# Patient Record
Sex: FEMALE | Race: WHITE | NOT HISPANIC OR LATINO | Employment: FULL TIME | URBAN - METROPOLITAN AREA
[De-identification: names, ages, dates, MRNs, and addresses within clinical notes are randomized per-mention and may not be internally consistent; named-entity substitution may affect disease eponyms.]

---

## 2017-08-08 ENCOUNTER — HOSPITAL ENCOUNTER (OUTPATIENT)
Facility: MEDICAL CENTER | Age: 32
End: 2017-08-08
Attending: ORTHOPAEDIC SURGERY | Admitting: ORTHOPAEDIC SURGERY
Payer: COMMERCIAL

## 2017-08-08 ENCOUNTER — APPOINTMENT (OUTPATIENT)
Dept: RADIOLOGY | Facility: MEDICAL CENTER | Age: 32
End: 2017-08-08
Attending: ORTHOPAEDIC SURGERY
Payer: COMMERCIAL

## 2017-08-08 VITALS
DIASTOLIC BLOOD PRESSURE: 54 MMHG | HEIGHT: 67 IN | RESPIRATION RATE: 14 BRPM | BODY MASS INDEX: 26.54 KG/M2 | WEIGHT: 169.09 LBS | TEMPERATURE: 98.8 F | HEART RATE: 61 BPM | OXYGEN SATURATION: 95 % | SYSTOLIC BLOOD PRESSURE: 106 MMHG

## 2017-08-08 PROBLEM — S92.254A CLOSED NONDISPLACED FRACTURE OF NAVICULAR BONE OF RIGHT FOOT: Status: ACTIVE | Noted: 2017-08-08

## 2017-08-08 LAB
HCG UR QL: NEGATIVE
SP GR UR REFRACTOMETRY: 1.02

## 2017-08-08 PROCEDURE — 700111 HCHG RX REV CODE 636 W/ 250 OVERRIDE (IP)

## 2017-08-08 PROCEDURE — 160047 HCHG PACU  - EA ADDL 30 MINS PHASE II: Performed by: ORTHOPAEDIC SURGERY

## 2017-08-08 PROCEDURE — 500423 HCHG DRESSING, ABD COMBINE: Performed by: ORTHOPAEDIC SURGERY

## 2017-08-08 PROCEDURE — 502000 HCHG MISC OR IMPLANTS RC 0278: Performed by: ORTHOPAEDIC SURGERY

## 2017-08-08 PROCEDURE — 160048 HCHG OR STATISTICAL LEVEL 1-5: Performed by: ORTHOPAEDIC SURGERY

## 2017-08-08 PROCEDURE — 500881 HCHG PACK, EXTREMITY: Performed by: ORTHOPAEDIC SURGERY

## 2017-08-08 PROCEDURE — 160046 HCHG PACU - 1ST 60 MINS PHASE II: Performed by: ORTHOPAEDIC SURGERY

## 2017-08-08 PROCEDURE — 160022 HCHG BLOCK: Performed by: ORTHOPAEDIC SURGERY

## 2017-08-08 PROCEDURE — 160035 HCHG PACU - 1ST 60 MINS PHASE I: Performed by: ORTHOPAEDIC SURGERY

## 2017-08-08 PROCEDURE — 160039 HCHG SURGERY MINUTES - EA ADDL 1 MIN LEVEL 3: Performed by: ORTHOPAEDIC SURGERY

## 2017-08-08 PROCEDURE — 160002 HCHG RECOVERY MINUTES (STAT): Performed by: ORTHOPAEDIC SURGERY

## 2017-08-08 PROCEDURE — 73630 X-RAY EXAM OF FOOT: CPT | Mod: RT

## 2017-08-08 PROCEDURE — 501480 HCHG STOCKINETTE: Performed by: ORTHOPAEDIC SURGERY

## 2017-08-08 PROCEDURE — A9270 NON-COVERED ITEM OR SERVICE: HCPCS

## 2017-08-08 PROCEDURE — 500367 HCHG DRAIN KIT, HEMOVAC: Performed by: ORTHOPAEDIC SURGERY

## 2017-08-08 PROCEDURE — 700101 HCHG RX REV CODE 250

## 2017-08-08 PROCEDURE — 700102 HCHG RX REV CODE 250 W/ 637 OVERRIDE(OP)

## 2017-08-08 PROCEDURE — 160036 HCHG PACU - EA ADDL 30 MINS PHASE I: Performed by: ORTHOPAEDIC SURGERY

## 2017-08-08 PROCEDURE — 81025 URINE PREGNANCY TEST: CPT

## 2017-08-08 PROCEDURE — 160009 HCHG ANES TIME/MIN: Performed by: ORTHOPAEDIC SURGERY

## 2017-08-08 PROCEDURE — 160025 RECOVERY II MINUTES (STATS): Performed by: ORTHOPAEDIC SURGERY

## 2017-08-08 PROCEDURE — 501838 HCHG SUTURE GENERAL: Performed by: ORTHOPAEDIC SURGERY

## 2017-08-08 PROCEDURE — 160028 HCHG SURGERY MINUTES - 1ST 30 MINS LEVEL 3: Performed by: ORTHOPAEDIC SURGERY

## 2017-08-08 PROCEDURE — A6454 SELF-ADHER BAND W>=3" <5"/YD: HCPCS | Performed by: ORTHOPAEDIC SURGERY

## 2017-08-08 PROCEDURE — 502240 HCHG MISC OR SUPPLY RC 0272: Performed by: ORTHOPAEDIC SURGERY

## 2017-08-08 DEVICE — IMPLANTABLE DEVICE: Type: IMPLANTABLE DEVICE | Status: FUNCTIONAL

## 2017-08-08 RX ORDER — HALOPERIDOL 5 MG/ML
INJECTION INTRAMUSCULAR
Status: COMPLETED
Start: 2017-08-08 | End: 2017-08-08

## 2017-08-08 RX ORDER — SODIUM CHLORIDE, SODIUM LACTATE, POTASSIUM CHLORIDE, CALCIUM CHLORIDE 600; 310; 30; 20 MG/100ML; MG/100ML; MG/100ML; MG/100ML
INJECTION, SOLUTION INTRAVENOUS CONTINUOUS
Status: DISCONTINUED | OUTPATIENT
Start: 2017-08-08 | End: 2017-08-08 | Stop reason: HOSPADM

## 2017-08-08 RX ORDER — LORAZEPAM 2 MG/ML
INJECTION INTRAMUSCULAR
Status: COMPLETED
Start: 2017-08-08 | End: 2017-08-08

## 2017-08-08 RX ORDER — OXYCODONE HCL 5 MG/5 ML
SOLUTION, ORAL ORAL
Status: COMPLETED
Start: 2017-08-08 | End: 2017-08-08

## 2017-08-08 RX ADMIN — FENTANYL CITRATE 50 MCG: 50 INJECTION, SOLUTION INTRAMUSCULAR; INTRAVENOUS at 12:32

## 2017-08-08 RX ADMIN — HYDROMORPHONE HYDROCHLORIDE 0.25 MG: 1 INJECTION, SOLUTION INTRAMUSCULAR; INTRAVENOUS; SUBCUTANEOUS at 13:02

## 2017-08-08 RX ADMIN — HALOPERIDOL LACTATE 1 MG: 5 INJECTION, SOLUTION INTRAMUSCULAR at 11:37

## 2017-08-08 RX ADMIN — LORAZEPAM 1 MG: 2 INJECTION INTRAMUSCULAR; INTRAVENOUS at 11:30

## 2017-08-08 RX ADMIN — FENTANYL CITRATE 50 MCG: 50 INJECTION, SOLUTION INTRAMUSCULAR; INTRAVENOUS at 12:05

## 2017-08-08 RX ADMIN — OXYCODONE HYDROCHLORIDE 10 MG: 5 SOLUTION ORAL at 12:10

## 2017-08-08 RX ADMIN — HYDROMORPHONE HYDROCHLORIDE 0.5 MG: 1 INJECTION, SOLUTION INTRAMUSCULAR; INTRAVENOUS; SUBCUTANEOUS at 13:08

## 2017-08-08 ASSESSMENT — PAIN SCALES - GENERAL
PAINLEVEL_OUTOF10: 6
PAINLEVEL_OUTOF10: 8
PAINLEVEL_OUTOF10: 6
PAINLEVEL_OUTOF10: 7
PAINLEVEL_OUTOF10: 6
PAINLEVEL_OUTOF10: 8
PAINLEVEL_OUTOF10: 6
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
## 2017-08-08 NOTE — OR SURGEON
Operative Report    PreOp Diagnosis: Right navicular fx    PostOp Diagnosis: Same, stable ankle    Procedure(s):  FOOT ORIF NAVICULAR  - Wound Class: Clean    Surgeon(s):  Patrick Whitten M.D.    Anesthesiologist/Type of Anesthesia:  Anesthesiologist: Tong Forde M.D./General    Surgical Staff:  Circulator: Lali Tsang R.N.  Relief Circulator: Dorys Hernández R.N.  Relief Scrub: Hector Corbin R.N.  Scrub Person: Libby Kramer    Specimens:  * No specimens in log *    Estimated Blood Loss: 5cc    TT: 12 min @ 250mmHg    Findings: Stable ankle    Complications: None        8/8/2017 11:53 AM Patrick Whitten

## 2017-08-08 NOTE — DISCHARGE INSTRUCTIONS
ACTIVITY: Rest and take it easy for the first 24 hours.  A responsible adult is recommended to remain with you during that time.  It is normal to feel sleepy.  We encourage you to not do anything that requires balance, judgment or coordination.    MILD FLU-LIKE SYMPTOMS ARE NORMAL. YOU MAY EXPERIENCE GENERALIZED MUSCLE ACHES, THROAT IRRITATION, HEADACHE AND/OR SOME NAUSEA.    FOR 24 HOURS DO NOT:  Drive, operate machinery or run household appliances.  Drink beer or alcoholic beverages.   Make important decisions or sign legal documents.    SPECIAL INSTRUCTIONS:NWB RLE  Ice and elevate  Keep splint clean and dry  Stay ahead  of pain    DIET: To avoid nausea, slowly advance diet as tolerated, avoiding spicy or greasy foods for the first day.  Add more substantial food to your diet according to your physician's instructions.  Babies can be fed formula or breast milk as soon as they are hungry.  INCREASE FLUIDS AND FIBER TO AVOID CONSTIPATION.    SURGICAL DRESSING/BATHING:cover dressing while bathing    FOLLOW-UP APPOINTMENT:as scheduled      You should CALL YOUR PHYSICIAN if you develop:  Fever greater than 101 degrees F.  Pain not relieved by medication, or persistent nausea or vomiting.  Excessive bleeding (blood soaking through dressing) or unexpected drainage from the wound.  Extreme redness or swelling around the incision site, drainage of pus or foul smelling drainage.  Inability to urinate or empty your bladder within 8 hours.  Problems with breathing or chest pain.    You should call 911 if you develop problems with breathing or chest pain.  If you are unable to contact your doctor or surgical center, you should go to the nearest emergency room or urgent care center.       If any questions arise, call your doctor.  If your doctor is not available, please feel free to call the Surgical Center at [phone].  The Center is open Monday through Friday from 7AM to 7PM.  You can also call the HEALTH HOTLINE open  24 hours/day, 7 days/week and speak to a nurse at [phone], or toll free at [phone].    A registered nurse may call you a few days after your surgery to see how you are doing after your procedure.    MEDICATIONS: Resume taking daily medication.  Take prescribed pain medication with food.  If no medication is prescribed, you may take non-aspirin pain medication if needed.  PAIN MEDICATION CAN BE VERY CONSTIPATING.  Take a stool softener or laxative such as senokot, pericolace, or milk of magnesia if needed.        If your physician has prescribed pain medication that includes Acetaminophen (Tylenol), do not take additional Acetaminophen (Tylenol) while taking the prescribed medication.    Depression / Suicide Risk    As you are discharged from this [facility] facility, it is important to learn how to keep safe from harming yourself.    Recognize the warning signs:  · Abrupt changes in personality, positive or negative- including increase in energy   · Giving away possessions  · Change in eating patterns- significant weight changes-  positive or negative  · Change in sleeping patterns- unable to sleep or sleeping all the time   · Unwillingness or inability to communicate  · Depression  · Unusual sadness, discouragement and loneliness  · Talk of wanting to die  · Neglect of personal appearance   · Rebelliousness- reckless behavior  · Withdrawal from people/activities they love  · Confusion- inability to concentrate     If you or a loved one observes any of these behaviors or has concerns about self-harm, here's what you can do:  · Talk about it- your feelings and reasons for harming yourself  · Remove any means that you might use to hurt yourself (examples: pills, rope, extension cords, firearm)  · Get professional help from the community (Mental Health, Substance Abuse, psychological counseling)  · Do not be alone:Call your Safe Contact- someone whom you trust who will be there for you.  · Call your  local CRISIS HOTLINE 105-8302 or 257-958-8272  · Call your local Children's Mobile Crisis Response Team [place] (288) 918-5799 or www.[employer]  · Call the toll free National Suicide Prevention Hotlines   · National Suicide Prevention Lifeline 420-243-XHAD (8113)  · National [facility] [place] Line Network 800-SUICIDE (954-5833)

## 2017-08-08 NOTE — IP AVS SNAPSHOT
" Home Care Instructions                                                                                                                Name:Thania Hilliard  Medical Record Number:[id number]  CSN: [id number]    YOB: 1985   Age: 32 y.o.  Sex: female  HT:1.702 m (5' 7\") WT: 76.7 kg (169 lb 1.5 oz)          Admit Date: 8/8/2017     Discharge Date:   Today's Date: 8/8/2017  Attending Doctor:  Patrick Whitten M.D.                  Allergies:  Other food; Promethazine hcl; and Tape                Discharge Instructions         ACTIVITY: Rest and take it easy for the first 24 hours.  A responsible adult is recommended to remain with you during that time.  It is normal to feel sleepy.  We encourage you to not do anything that requires balance, judgment or coordination.    MILD FLU-LIKE SYMPTOMS ARE NORMAL. YOU MAY EXPERIENCE GENERALIZED MUSCLE ACHES, THROAT IRRITATION, HEADACHE AND/OR SOME NAUSEA.    FOR 24 HOURS DO NOT:  Drive, operate machinery or run household appliances.  Drink beer or alcoholic beverages.   Make important decisions or sign legal documents.    SPECIAL INSTRUCTIONS:NWB RLE  Ice and elevate  Keep splint clean and dry  Stay ahead  of pain    DIET: To avoid nausea, slowly advance diet as tolerated, avoiding spicy or greasy foods for the first day.  Add more substantial food to your diet according to your physician's instructions.  Babies can be fed formula or breast milk as soon as they are hungry.  INCREASE FLUIDS AND FIBER TO AVOID CONSTIPATION.    SURGICAL DRESSING/BATHING:cover dressing while bathing    FOLLOW-UP APPOINTMENT:as scheduled      You should CALL YOUR PHYSICIAN if you develop:  Fever greater than 101 degrees F.  Pain not relieved by medication, or persistent nausea or vomiting.  Excessive bleeding (blood soaking through dressing) or unexpected drainage from the wound.  Extreme redness or swelling around the incision site, drainage of pus or foul smelling drainage.  Inability to " urinate or empty your bladder within 8 hours.  Problems with breathing or chest pain.    You should call 911 if you develop problems with breathing or chest pain.  If you are unable to contact your doctor or surgical center, you should go to the nearest emergency room or urgent care center.       If any questions arise, call your doctor.  If your doctor is not available, please feel free to call the Surgical Center at [phone].  The Center is open Monday through Friday from 7AM to 7PM.  You can also call the HEALTH HOTLINE open 24 hours/day, 7 days/week and speak to a nurse at [phone], or toll free at [phone].    A registered nurse may call you a few days after your surgery to see how you are doing after your procedure.    MEDICATIONS: Resume taking daily medication.  Take prescribed pain medication with food.  If no medication is prescribed, you may take non-aspirin pain medication if needed.  PAIN MEDICATION CAN BE VERY CONSTIPATING.  Take a stool softener or laxative such as senokot, pericolace, or milk of magnesia if needed.        If your physician has prescribed pain medication that includes Acetaminophen (Tylenol), do not take additional Acetaminophen (Tylenol) while taking the prescribed medication.    Depression / Suicide Risk    As you are discharged from this [facility] facility, it is important to learn how to keep safe from harming yourself.    Recognize the warning signs:  · Abrupt changes in personality, positive or negative- including increase in energy   · Giving away possessions  · Change in eating patterns- significant weight changes-  positive or negative  · Change in sleeping patterns- unable to sleep or sleeping all the time   · Unwillingness or inability to communicate  · Depression  · Unusual sadness, discouragement and loneliness  · Talk of wanting to die  · Neglect of personal appearance   · Rebelliousness- reckless behavior  · Withdrawal from people/activities they  love  · Confusion- inability to concentrate     If you or a loved one observes any of these behaviors or has concerns about self-harm, here's what you can do:  · Talk about it- your feelings and reasons for harming yourself  · Remove any means that you might use to hurt yourself (examples: pills, rope, extension cords, firearm)  · Get professional help from the community (Mental Health, Substance Abuse, psychological counseling)  · Do not be alone:Call your Safe Contact- someone whom you trust who will be there for you.  · Call your local CRISIS HOTLINE 432-0324 or 867-963-7159  · Call your local Children's Mobile Crisis Response Team [place] (523) 220-0276 or www.[employer]  · Call the toll free National Suicide Prevention Hotlines   · National Suicide Prevention Lifeline 070-475-ROWV (6307)  · [place] [employer] Line Network 800-SUICIDE (934-7269)       Medication List      CONTINUE taking these medications        Instructions    Morning Afternoon Evening Bedtime    LEVOTHYROXINE SODIUM        Take 225 mcg by mouth every day.   Dose:  225 mcg                                Medication Information     Above and/or attached are the medications your physician expects you to take upon discharge. Review all of your home medications and newly ordered medications with your doctor and/or pharmacist. Follow medication instructions as directed by your doctor and/or pharmacist. Please keep your medication list with you and share with your physician. Update the information when medications are discontinued, doses are changed, or new medications (including over-the-counter products) are added; and carry medication information at all times in the event of emergency situations.        Resources     Quit Smoking / Tobacco Use:    I understand the use of any tobacco products increases my chance of suffering from future heart disease or stroke and could cause other illnesses which may shorten my life. Quitting the use of  tobacco products is the single most important thing I can do to improve my health. For further information on smoking / tobacco cessation call a Toll Free Quit Line at 1-889.361.3996 (*National Cancer [place]) or 1-960.333.5985 (American Lung Association) or you can access the web based program at www.lungusa.org.    [place] Tobacco Users Help Line:  (274) 105-3560       Toll Free: 1-580.957.5682  Quit Tobacco Program [facility] (178)126-0202    Crisis Hotline:    [place] Crisis Hotline:  1-393-OVOUWYF or 1-401.907.4831    [place] Crisis Hotline:    1-275.421.8120 or 829-330-0239    Discharge Survey:   Thank you for choosing [facility]. We hope we did everything we could to make your hospital stay a pleasant one. You may be receiving a survey and we would appreciate your time and participation in answering the questions. Your input is very valuable to us in our efforts to improve our service to our patients and their families.            Signatures     My signature on this form indicates that:    1. I acknowledge receipt and understanding of these Home Care Instruction.  2. My questions regarding this information have been answered to my satisfaction.  3. I have formulated a plan with my discharge nurse to obtain my prescribed medications for home.    __________________________________      __________________________________                   Patient Signature                                 Guardian/Responsible Adult Signature      __________________________________                 __________       ________                       Nurse Signature                                               Date                 Time
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
## 2018-06-19 ENCOUNTER — HOSPITAL ENCOUNTER (EMERGENCY)
Facility: MEDICAL CENTER | Age: 33
End: 2018-06-19
Attending: EMERGENCY MEDICINE
Payer: COMMERCIAL

## 2018-06-19 ENCOUNTER — OCCUPATIONAL MEDICINE (OUTPATIENT)
Dept: URGENT CARE | Facility: CLINIC | Age: 33
End: 2018-06-19
Payer: COMMERCIAL

## 2018-06-19 VITALS
WEIGHT: 175.6 LBS | OXYGEN SATURATION: 99 % | HEIGHT: 67 IN | BODY MASS INDEX: 27.56 KG/M2 | RESPIRATION RATE: 16 BRPM | TEMPERATURE: 99 F | SYSTOLIC BLOOD PRESSURE: 120 MMHG | HEART RATE: 71 BPM | DIASTOLIC BLOOD PRESSURE: 80 MMHG

## 2018-06-19 VITALS
RESPIRATION RATE: 16 BRPM | TEMPERATURE: 97.5 F | HEART RATE: 68 BPM | WEIGHT: 170 LBS | DIASTOLIC BLOOD PRESSURE: 83 MMHG | SYSTOLIC BLOOD PRESSURE: 131 MMHG | BODY MASS INDEX: 26.68 KG/M2 | HEIGHT: 67 IN

## 2018-06-19 DIAGNOSIS — S09.90XD INJURY OF HEAD, SUBSEQUENT ENCOUNTER: ICD-10-CM

## 2018-06-19 DIAGNOSIS — R11.2 INTRACTABLE VOMITING WITH NAUSEA, UNSPECIFIED VOMITING TYPE: ICD-10-CM

## 2018-06-19 DIAGNOSIS — E83.51 HYPOCALCEMIA: ICD-10-CM

## 2018-06-19 DIAGNOSIS — F07.81 POST CONCUSSION SYNDROME: ICD-10-CM

## 2018-06-19 DIAGNOSIS — F07.81 POST CONCUSSIVE SYNDROME: ICD-10-CM

## 2018-06-19 LAB
ALBUMIN SERPL BCP-MCNC: 4 G/DL (ref 3.2–4.9)
ALBUMIN/GLOB SERPL: 1.9 G/DL
ALP SERPL-CCNC: 53 U/L (ref 30–99)
ALT SERPL-CCNC: 13 U/L (ref 2–50)
ANION GAP SERPL CALC-SCNC: 9 MMOL/L (ref 0–11.9)
AST SERPL-CCNC: 15 U/L (ref 12–45)
BILIRUB SERPL-MCNC: 0.4 MG/DL (ref 0.1–1.5)
BUN SERPL-MCNC: 17 MG/DL (ref 8–22)
CALCIUM SERPL-MCNC: 6.9 MG/DL (ref 8.5–10.5)
CHLORIDE SERPL-SCNC: 107 MMOL/L (ref 96–112)
CO2 SERPL-SCNC: 25 MMOL/L (ref 20–33)
CREAT SERPL-MCNC: 0.79 MG/DL (ref 0.5–1.4)
GLOBULIN SER CALC-MCNC: 2.1 G/DL (ref 1.9–3.5)
GLUCOSE SERPL-MCNC: 89 MG/DL (ref 65–99)
LIPASE SERPL-CCNC: 23 U/L (ref 11–82)
POTASSIUM SERPL-SCNC: 3.7 MMOL/L (ref 3.6–5.5)
PROT SERPL-MCNC: 6.1 G/DL (ref 6–8.2)
SODIUM SERPL-SCNC: 141 MMOL/L (ref 135–145)

## 2018-06-19 PROCEDURE — 80053 COMPREHEN METABOLIC PANEL: CPT

## 2018-06-19 PROCEDURE — 83690 ASSAY OF LIPASE: CPT

## 2018-06-19 PROCEDURE — 96361 HYDRATE IV INFUSION ADD-ON: CPT

## 2018-06-19 PROCEDURE — 96375 TX/PRO/DX INJ NEW DRUG ADDON: CPT

## 2018-06-19 PROCEDURE — 700105 HCHG RX REV CODE 258: Performed by: EMERGENCY MEDICINE

## 2018-06-19 PROCEDURE — 99284 EMERGENCY DEPT VISIT MOD MDM: CPT

## 2018-06-19 PROCEDURE — 700111 HCHG RX REV CODE 636 W/ 250 OVERRIDE (IP): Performed by: EMERGENCY MEDICINE

## 2018-06-19 PROCEDURE — 96374 THER/PROPH/DIAG INJ IV PUSH: CPT

## 2018-06-19 PROCEDURE — 99202 OFFICE O/P NEW SF 15 MIN: CPT | Mod: 25,29 | Performed by: PHYSICIAN ASSISTANT

## 2018-06-19 RX ORDER — KETOROLAC TROMETHAMINE 10 MG/1
10 TABLET, FILM COATED ORAL EVERY 6 HOURS PRN
Qty: 20 TAB | Refills: 0 | Status: SHIPPED | OUTPATIENT
Start: 2018-06-19 | End: 2021-06-01

## 2018-06-19 RX ORDER — SODIUM CHLORIDE 9 MG/ML
1000 INJECTION, SOLUTION INTRAVENOUS ONCE
Status: COMPLETED | OUTPATIENT
Start: 2018-06-19 | End: 2018-06-19

## 2018-06-19 RX ORDER — ONDANSETRON 2 MG/ML
4 INJECTION INTRAMUSCULAR; INTRAVENOUS ONCE
Status: COMPLETED | OUTPATIENT
Start: 2018-06-19 | End: 2018-06-19

## 2018-06-19 RX ORDER — METOCLOPRAMIDE 10 MG/1
10 TABLET ORAL 4 TIMES DAILY PRN
Qty: 16 TAB | Refills: 0 | Status: SHIPPED | OUTPATIENT
Start: 2018-06-19 | End: 2021-06-01

## 2018-06-19 RX ORDER — METOCLOPRAMIDE HYDROCHLORIDE 5 MG/ML
10 INJECTION INTRAMUSCULAR; INTRAVENOUS ONCE
Status: COMPLETED | OUTPATIENT
Start: 2018-06-19 | End: 2018-06-19

## 2018-06-19 RX ORDER — KETOROLAC TROMETHAMINE 30 MG/ML
15 INJECTION, SOLUTION INTRAMUSCULAR; INTRAVENOUS ONCE
Status: COMPLETED | OUTPATIENT
Start: 2018-06-19 | End: 2018-06-19

## 2018-06-19 RX ADMIN — SODIUM CHLORIDE 1000 ML: 9 INJECTION, SOLUTION INTRAVENOUS at 14:30

## 2018-06-19 RX ADMIN — ONDANSETRON 4 MG: 2 INJECTION INTRAMUSCULAR; INTRAVENOUS at 11:47

## 2018-06-19 RX ADMIN — KETOROLAC TROMETHAMINE 15 MG: 30 INJECTION, SOLUTION INTRAMUSCULAR at 14:29

## 2018-06-19 RX ADMIN — METOCLOPRAMIDE 10 MG: 5 INJECTION, SOLUTION INTRAMUSCULAR; INTRAVENOUS at 14:29

## 2018-06-19 ASSESSMENT — PAIN SCALES - GENERAL: PAINLEVEL_OUTOF10: 0

## 2018-06-19 NOTE — LETTER
"  FORM C-4:  EMPLOYEE’S CLAIM FOR COMPENSATION/ REPORT OF INITIAL TREATMENT  EMPLOYEE’S CLAIM - PROVIDE ALL INFORMATION REQUESTED   First Name  Thania Last Name  Arminda Birthdate             Age  1985 33 y.o. Sex  female Claim Number   Home Employee Address  [address]                                     Zip  [address] Height  1.702 m (5' 7\") Weight  77.1 kg (170 lb) [facility]  [id number]   Mailing Employee Address                           [address]               Zip  [address] Telephone  [phone] (home)  Primary Language Spoken  ENGLISH   Insurer  *** Third Party   [employer] Employee's Occupation (Job Title) When Injury or Occupational Disease Occurred     Employer's Name   Telephone      Employer Address   City   State   Zip     Date of Injury  6/15/2018       Hour of Injury  1:00 PM Date Employer Notified  6/15/2018 Last Day of Work after Injury or Occupational Disease  6/15/2018 Supervisor to Whom Injury Reported  Estefany Salinas    Address or Location of Accident (if applicable)     What were you doing at the time of accident? (if applicable)  Working at sponsorship table for [employer]     How did this injury or occupational disease occur? Be specific and answer in detail. Use additional sheet if necessary)  A steel sign fell and hit me in the head.    If you believe that you have an occupational disease, when did you first have knowledge of the disability and it relationship to your employment?  n/a Witnesses to the Accident  Saskia singh      Nature of Injury or Occupational Disease  Workers' Compensation  Part(s) of Body Injured or Affected  Skull, Soft Tissue - Neck, Upper Back Area (Thoracic Area)    I certify that the above is true and correct to the best of my knowledge and that I have provided this information in order to obtain the benefits of [place] " [place] inclusive or Chapter 617 of [place]).  I hereby authorize any physician, chiropractor, surgeon, practitioner, or other person, any hospital, including [facility] or [facility] hospital, any medical service organization, any insurance company, or other institution or organization to release to each other, any medical or other information, including benefits paid or payable, pertinent to this injury or disease, except information relative to diagnosis, treatment and/or counseling for AIDS, psychological conditions, alcohol or controlled substances, for which I must give specific authorization.  A Photostat of this authorization shall be as valid as the original.   Date Place   Employee’s Signature   THIS REPORT MUST BE COMPLETED AND MAILED WITHIN 3 WORKING DAYS OF TREATMENT   Place  [facility], EMERGENCY DEPT  Name of Facility   [facility]   Date  6/19/2018 Diagnosis  No diagnosis found. Is there evidence the injured employee was under the influence of alcohol and/or another controlled substance at the time of accident?   Hour  3:23 PM Description of Injury or Disease       Treatment     Have you advised the patient to remain off work five days or more?             X-Ray Findings      If Yes   From Date    To Date      From information given by the employee, together with medical evidence, can you directly connect this injury or occupational disease as job incurred?    If No, is the employee capable of: Full Duty    Modified Duty      Is additional medical care by a physician indicated?    If Modified Duty, Specify any Limitations / Restrictions        Do you know of any previous injury or disease contributing to this condition or occupational disease?      Date  6/19/2018 Print Doctor’s Name  Renita Vasquez I certify the employer’s copy of this form was mailed on:   Address  [address] [place] [address] [place] [address] [phone] Insurer’s Use Only   [facility]  "[facility]  [address]    Provider’s Tax ID Number    Telephone  Dept: [phone]    Doctor’s Signature    Degree       Original - TREATING PHYSICIAN OR CHIROPRACTOR   Pg 2-Insurer/TPA   Pg 3-Employer   Pg 4-Employee                                                                                                  Form C-4 (rev01/03)     BRIEF DESCRIPTION OF RIGHTS AND BENEFITS  (Pursuant to NRS 616C.050)    Notice of Injury or Occupational Disease (Incident Report Form C-1): If an injury or occupational disease (OD) arises out of and in the course of employment, you must provide written notice to your employer as soon as practicable, but no later than 7 days after the accident or OD. Your employer shall maintain a sufficient supply of the required forms.    Claim for Compensation (Form C-4): If medical treatment is sought, the form C-4 is available at the place of initial treatment. A completed \"Claim for Compensation\" (Form C-4) must be filed within 90 days after an accident or OD. The treating physician or chiropractor must, within 3 working days after treatment, complete and mail to the employer, the employer's insurer and third-party , the Claim for Compensation.    Medical Treatment: If you require medical treatment for your on-the-job injury or OD, you may be required to select a physician or chiropractor from a list provided by your workers’ compensation insurer, if it has contracted with an Organization for Managed Care (MCO) or Preferred Provider Organization (PPO) or providers of health care. If your employer has not entered into a contract with an MCO or PPO, you may select a physician or chiropractor from the Panel of Physicians and Chiropractors. Any medical costs related to your industrial injury or OD will be paid by your insurer.    Temporary Total Disability (TTD): If your doctor has certified that you are unable to work for a period of at least 5 consecutive days, or 5 " cumulative days in a 20-day period, or places restrictions on you that your employer does not accommodate, you may be entitled to TTD compensation.    Temporary Partial Disability (TPD): If the wage you receive upon reemployment is less than the compensation for TTD to which you are entitled, the insurer may be required to pay you TPD compensation to make up the difference. TPD can only be paid for a maximum of 24 months.    Permanent Partial Disability (PPD): When your medical condition is stable and there is an indication of a PPD as a result of your injury or OD, within 30 days, your insurer must arrange for an evaluation by a rating physician or chiropractor to determine the degree of your PPD. The amount of your PPD award depends on the date of injury, the results of the PPD evaluation and your age and wage.    Permanent Total Disability (PTD): If you are medically certified by a treating physician or chiropractor as permanently and totally disabled and have been granted a PTD status by your insurer, you are entitled to receive monthly benefits not to exceed 66 2/3% of your average monthly wage. The amount of your PTD payments is subject to reduction if you previously received a PPD award.    Vocational Rehabilitation Services: You may be eligible for vocational rehabilitation services if you are unable to return to the job due to a permanent physical impairment or permanent restrictions as a result of your injury or occupational disease.    Transportation and Per Parisa Reimbursement: You may be eligible for travel expenses and per parisa associated with medical treatment.  Reopening: You may be able to reopen your claim if your condition worsens after claim closure.    Appeal Process: If you disagree with a written determination issued by the insurer or the insurer does not respond to your request, you may appeal to the Department of Administration, , by following the instructions contained in your  determination letter. You must appeal the determination within 70 days from the date of the determination letter at [address], [address], [place], [place] [address], or [address], [address], [place], [place] [address]. If you disagree with the  decision, you may appeal to the Department of Administration, . You must file your appeal within 30 days from the date of the  decision letter at [address], [address], [place], [place] [address], or [address], [address], [place], [place] [address]. If you disagree with a decision of an , you may file a petition for judicial review with the District Court. You must do so within 30 days of the Appeal Officer’s decision. You may be represented by an  at your own expense or you may contact the [facility] for possible representation.    [place]  for Injured Workers (NAIW): If you disagree with a  decision, you may request that NAIW represent you without charge at an  Hearing. For information regarding denial of benefits, you may contact the [facility] at: [address], [address], [place] [address], [phone], or [address], [address], [place] [address], [phone]    To File a Complaint with the Division: If you wish to file a complaint with the  of the Division of Industrial Relations (DIR), please contact the Workers’ Compensation Section, [address], [address], [place], [place] [address], telephone [phone], or [address], [address], [place] [address], telephone [phone].    For assistance with Workers’ Compensation Issues: you may contact the Office of the Governor Consumer Health Assistance, [address], [address], [place], [place] [address], Toll Free 1-886.370.9407, Web site: http://govcha.[facility].nv., E-mail [email or web address].                                                                                                                                                                                __________________________________________________________________                                    _________________            Employee Name / Signature                                                                                                                            Date                                       D-2 (rev. 10/07)

## 2018-06-19 NOTE — ED TRIAGE NOTES
Chief Complaint   Patient presents with   • Nausea     A LARGE SIGN FELL ON THE PT'S HEAD ON FRIDAY AT WORK.  SHE WENT TO [facility] AND HAD A NEGATIVE CT SCAN.  WAS SEEN AT URGENT CARE TODAY FOR A RECHECK AND BECAUSE SHE WAS FEELING NAUSEOUS AND THEY SENT HER HERE FOR UNCONTROLLED NAUSEA   • Head Injury

## 2018-06-19 NOTE — LETTER
"EMPLOYEE’S CLAIM FOR COMPENSATION/ REPORT OF INITIAL TREATMENT  FORM C-4    EMPLOYEE’S CLAIM - PROVIDE ALL INFORMATION REQUESTED   First Name  Thania Last Name  Arminda Birthdate                    1985                Sex  female Claim Number   Home Address  [address] Age  33 y.o. Height  1.702 m (5' 7\") Weight  79.7 kg (175 lb 9.6 oz) [address]     [facility] Zip  [address] Telephone  [phone] (home)    Mailing Address  [address] [facility] Zip  [address] Primary Language Spoken  English    Insurer   Third Party   [employer]   Employee's Occupation (Job Title) When Injury or Occupational Disease Occurred       Employer's Name    [employer]  Telephone  [phone]    Employer Address  [address]  Zip  [address]   Date of Injury  6/15/2018               Hour of Injury  1:00 PM Date Employer Notified  6/15/2018 Last Day of Work after Injury or Occupational Disease  6/15/2018 Supervisor to Whom Injury Reported  Estefany Salinas    Address or Location of Accident (if applicable)  [[facility] [facility] [address] ]   What were you doing at the time of accident? 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 (Be specific an answer in detail. Use additional sheet if necessary)  A steel sign fell and hit me in the head.    If you believe that you have an occupational disease, when did you first have knowledge of the disability and it relationship to your employment?  n/a Witnesses to the Accident  Saskia singh       Nature of Injury or Occupational Disease  Workers' Compensation  Part(s) of Body Injured or Affected  Skull, Soft Tissue - Neck, Upper Back Area (Thoracic Area)    I certify that the above is true and correct to the best of my knowledge and that I " have provided this information in order to obtain the benefits of [place]’s [place] ([place] 616A to 616D, inclusive or Chapter 617 of [place]RS).  I hereby authorize any physician, chiropractor, surgeon, practitioner, or other person, any hospital, including [facility] or [facility] hospital, any medical service organization, any insurance company, or other institution or organization to release to each other, any medical or other information, including benefits paid or payable, pertinent to this injury or disease, except information relative to diagnosis, treatment and/or counseling for AIDS, psychological conditions, alcohol or controlled substances, for which I must give specific authorization.  A Photostat of this authorization shall be as valid as the original.     Date06/19/2018   Place [facility]    Employee’s Signature   THIS REPORT MUST BE COMPLETED AND MAILED WITHIN 3 WORKING DAYS OF TREATMENT   [facility] URGENT CARE  Name of Facility  [facility]   Date  6/19/2018 Diagnosis  (F07.81) Post concussive syndrome  (S09.90XD) Injury of head, subsequent encounter  (R11.2) Intractable vomiting with nausea, unspecified vomiting type Is there evidence the injured employee was under the influence of alcohol and/or another controlled substance at the time of accident?   Hour  11:15 AM Description of Injury or Disease  Diagnoses of Post concussive syndrome, Injury of head, subsequent encounter, and Intractable vomiting with nausea, unspecified vomiting type were pertinent to this visit.     Treatment  Pt with intractable vomiting throughout the visit today- failed Zofran ODT and IM Zofran. Unable to tolerate PO fluids at this time. Pt. Was sent to the ER for further evaluation and management. Pt.was unable to contact someone to take her and she requests Ambulance transport.   Pt. Left with [facility] records in hand.   Have you advised the patient to  "remain off work five days or more?     X-Ray Findings    Comments:Negative head CT, C-spine, and xr lumbar.    If Yes   From Date  To Date      From information given by the employee, together with medical evidence, can you directly connect this injury or occupational disease as job incurred?  Yes If No Full Duty  No Modified Duty      Is additional medical care by a physician indicated?  Yes If Modified Duty, Specify any Limitations / Restrictions      Do you know of any previous injury or disease contributing to this condition or occupational disease?                                Date  6/19/2018 Print Doctor’s Name Lavon Lucero P.A.-C. I certify the employer’s copy of  this form was mailed on:   Address  [address] Insurer’s Use Only     [facility]  [address]    Provider’s Tax ID Number  [id number] Telephone  Dept: [phone]        e-LAVON Oswald P.A.-C.   e-Signature: Dr. Demetri Holloway, Medical Director Degree  HOLLI        ORIGINAL-TREATING PHYSICIAN OR CHIROPRACTOR    PAGE 2-INSURER/TPA    PAGE 3-EMPLOYER    PAGE 4-EMPLOYEE             Form C-4 (rev10/07)              BRIEF DESCRIPTION OF RIGHTS AND BENEFITS  ([place]ursuant to [place] 616C.050)    Notice of Injury or Occupational Disease (Incident Report Form C-1): If an injury or occupational disease (OD) arises out of and in the  course of employment, you must provide written notice to your employer as soon as practicable, but no later than 7 days after the accident or  OD. Your employer shall maintain a sufficient supply of the required forms.    Claim for Compensation (Form C-4): If medical treatment is sought, the form C-4 is available at the place of initial treatment. A completed  \"Claim for Compensation\" (Form C-4) must be filed within 90 days after an accident or OD. The treating physician or chiropractor must,  within 3 working days after treatment, complete and mail to the employer, the employer's insurer and " third-party , the Claim for  Compensation.    Medical Treatment: If you require medical treatment for your on-the-job injury or OD, you may be required to select a physician or  chiropractor from a list provided by your workers’ compensation insurer, if it has contracted with an Organization for Managed Care (MCO) or  Preferred Provider Organization (PPO) or providers of health care. If your employer has not entered into a contract with an MCO or PPO, you  may select a physician or chiropractor from the Panel of Physicians and Chiropractors. Any medical costs related to your industrial injury or  OD will be paid by your insurer.    Temporary Total Disability (TTD): If your doctor has certified that you are unable to work for a period of at least 5 consecutive days, or 5  cumulative days in a 20-day period, or places restrictions on you that your employer does not accommodate, you may be entitled to TTD  compensation.    Temporary Partial Disability (TPD): If the wage you receive upon reemployment is less than the compensation for TTD to which you are  entitled, the insurer may be required to pay you TPD compensation to make up the difference. TPD can only be paid for a maximum of 24  months.    Permanent Partial Disability (PPD): When your medical condition is stable and there is an indication of a PPD as a result of your injury or  OD, within 30 days, your insurer must arrange for an evaluation by a rating physician or chiropractor to determine the degree of your PPD. The  amount of your PPD award depends on the date of injury, the results of the PPD evaluation and your age and wage.    Permanent Total Disability (PTD): If you are medically certified by a treating physician or chiropractor as permanently and totally disabled  and have been granted a PTD status by your insurer, you are entitled to receive monthly benefits not to exceed 66 2/3% of your average  monthly wage. The amount of your PTD  payments is subject to reduction if you previously received a PPD award.    Vocational Rehabilitation Services: You may be eligible for vocational rehabilitation services if you are unable to return to the job due to a  permanent physical impairment or permanent restrictions as a result of your injury or occupational disease.    Transportation and Per Parisa Reimbursement: You may be eligible for travel expenses and per praisa associated with medical treatment.    Reopening: You may be able to reopen your claim if your condition worsens after claim closure.    Appeal Process: If you disagree with a written determination issued by the insurer or the insurer does not respond to your request, you may  appeal to the Department of Administration, , by following the instructions contained in your determination letter. You must  appeal the determination within 70 days from the date of the determination letter at [address], [address], [place], [place]  [address], or [address], [address], [place] [address]. If you disagree with the  decision, you may appeal to the  Department of Administration, . You must file your appeal within 30 days from the date of the  decision  letter at [address], [address], [place], [place] [address], or [address], [address], [place], [place] [address]. If you  disagree with a decision of an , you may file a petition for judicial review with the District Court. You must do so within 30  days of the Appeal Officer’s decision. You may be represented by an  at your own expense or you may contact the [facility] for possible  representation.    [place]  for Injured Workers ([place]IW): If you disagree with a  decision, you may request that NAIW represent you  without charge at an  Hearing. For information regarding denial of benefits, you may contact  the NAIW at: [address], [address], [place] [address], [phone], or [address], [address], [place] [address], [phone]    To File a Complaint with the Division: If you wish to file a complaint with the  of the Division of Industrial Relations (DIR),  please contact the Workers’ Compensation Section, [address], [address], [place], [place] [address], telephone [phone]) [phone], or  [address], [address], [place], [place] [address], telephone [phone].    For assistance with Workers’ Compensation Issues: you may contact the Office of the Governor Consumer Health Assistance, [address] [place] [address], [address], [place], [place] [address], Toll Free 1-746.240.6849, Web site: http://govcha.[facility].nv., E-mail  [email or web address].                                                                                                                                                                                                                                   __________________________________________________________________                                                                   _________________                Employee Name / Signature                                                                                                                                                       Date                                                                                                                                                                                                     D-2 (rev. 10/07)

## 2018-06-19 NOTE — DISCHARGE INSTRUCTIONS
Hypocalcemia, Adult  Introduction  Hypocalcemia is when the level of calcium in a person's blood is below normal. Calcium is a mineral that is used by the body in many ways. A lack of blood calcium can affect the heart and muscles, make the bones more likely to break, and cause other problems.  What are the causes?  This condition may be caused by:  · Decreased production (hypoparathyroidism) or improper use of parathyroid hormone.  · Problems with the parathyroid glands or surgical removal of these glands.  · Problems with parathyroid function after removal of the thyroid gland.  · Lack (deficiency) of vitamin D or magnesium or both.  · Kidney problems.  Less common causes include:  · Intestinal problems that interfere with nutrient absorption.  · Alcoholism.  · Low levels of a body protein that is called albumin.  · Inflammation of the pancreas (pancreatitis).  · Certain medicines.  · Severe infections (sepsis).  · Certain diseases, such as sarcoidosis or hemochromatosis, that cause the parathyroid glands to be filled with cells or substances that are not normally present.  · Breakdown of large amounts of muscle fiber.  · High levels of phosphate in the body.  · Cancer.  · Massive blood transfusions, which usually occur with severe trauma.  What are the signs or symptoms?  Symptoms of this condition include:  · Numbness and tingling in the fingers, toes, or around the mouth.  · Muscle aches or cramps, especially in the legs, feet, and back.  · Muscle twitches.  · Abdominal cramping or pain.  · Memory problems, confusion, or difficulty thinking.  · Depression, anxiety, irritability, or changes in personality.  · Fainting.  · Chest pain.  · Difficulty swallowing.  · Changes in the sound of the voice.  · Shortness of breath or wheezing.  · General weakness and fatigue.  Symptoms of severe hypocalcemia include:  · Shaking uncontrollably (seizures).  · Seizure of the voice box (laryngospasm).  · Fast heartbeats  (palpitations) and abnormal heart rhythms (arrhythmias).  Long-term symptoms of this condition include:  · Coarse, brittle hair and nails.  · Dry skin or lasting (chronic) skin diseases (psoriasis, eczema,, or dermatitis).  · Clouding of the eye lens (cataracts).  How is this diagnosed?  This condition is usually diagnosed with a blood test. You may also have other tests to help determine the underlying cause of the condition. For example, a test may be done that records the electrical activity of the heart (electrocardiogram,or ECG).  How is this treated?  Treatment for this condition may include:  · Calcium given by mouth (orally) or given through an IV tube that is inserted into one of your veins. The method used for giving calcium will depend on the severity of the condition.  · Other minerals (electrolytes), such as magnesium.  Other treatment will depend on the cause of the condition.  Follow these instructions at home:  · Follow diet instructions from your health care provider or dietitian.  · Take supplements only as told by your health care provider.  · Keep all follow-up visits as told by your health care provider. This is important.  Contact a health care provider if:  · You have increased fatigue.  · You have increased muscle twitching.  · You have new swelling in the feet, ankles, or legs.  · You develop changes in mood, memory, or personality.  Get help right away if:  · You have chest pain.  · You have persistent rapid or irregular heartbeats.  · You have difficulty breathing.  · You faint.  · You start to have seizures.  · You have confusion.  This information is not intended to replace advice given to you by your health care provider. Make sure you discuss any questions you have with your health care provider.  Document Released: 06/07/2011 Document Revised: 05/25/2017 Document Reviewed: 05/04/2016  © 2017 Elsevier      Post-Concussion Syndrome  Introduction  Post-concussion syndrome describes the  symptoms that can occur after a head injury. These symptoms can last from weeks to months.  What are the causes?  It is not clear why some head injuries cause post-concussion syndrome. It can occur whether your head injury was mild or severe and whether you were wearing head protection or not.  What are the signs or symptoms?  · Memory difficulties.  · Dizziness.  · Headaches.  · Double vision or blurry vision.  · Sensitivity to light.  · Hearing difficulties.  · Depression.  · Tiredness.  · Weakness.  · Difficulty with concentration.  · Difficulty sleeping or staying asleep.  · Vomiting.  · Poor balance or instability on your feet.  · Slow reaction time.  · Difficulty learning and remembering things you have heard.  How is this diagnosed?  There is no test to determine whether you have post-concussion syndrome. Your health care provider may order an imaging scan of your brain, such as a CT scan, to check for other problems that may be causing your symptoms (such as a severe injury inside your skull).  How is this treated?  Usually, these problems disappear over time without medical care. Your health care provider may prescribe medicine to help ease your symptoms. It is important to follow up with a neurologist to evaluate your recovery and address any lingering symptoms or issues.  Follow these instructions at home:  · Take medicines only as directed by your health care provider. Do not take aspirin. Aspirin can slow blood clotting.  · Sleep with your head slightly elevated to help with headaches.  · Avoid any situation where there is potential for another head injury. This includes football, hockey, soccer, basketball, martial arts, downhill snow sports, and horseback riding. Your condition will get worse every time you experience a concussion. You should avoid these activities until you are evaluated by the appropriate follow-up health care providers.  · Keep all follow-up visits as directed by your health care  provider. This is important.  Contact a health care provider if:  · You have increased problems paying attention or concentrating.  · You have increased difficulty remembering or learning new information.  · You need more time to complete tasks or assignments than before.  · You have increased irritability or decreased ability to cope with stress.  · You have more symptoms than before.  Seek medical care if you have any of the following symptoms for more than two weeks after your injury:  · Lasting (chronic) headaches.  · Dizziness or balance problems.  · Nausea.  · Vision problems.  · Increased sensitivity to noise or light.  · Depression or mood swings.  · Anxiety or irritability.  · Memory problems.  · Difficulty concentrating or paying attention.  · Sleep problems.  · Feeling tired all the time.  Get help right away if:  · You have confusion or unusual drowsiness.  · Others find it difficult to wake you up.  · You have nausea or persistent, forceful vomiting.  · You feel like you are moving when you are not (vertigo). Your eyes may move rapidly back and forth.  · You have convulsions or faint.  · You have severe, persistent headaches that are not relieved by medicine.  · You cannot use your arms or legs normally.  · One of your pupils is larger than the other.  · You have clear or bloody discharge from your nose or ears.  · Your problems are getting worse, not better.  This information is not intended to replace advice given to you by your health care provider. Make sure you discuss any questions you have with your health care provider.  Document Released: 06/09/2003 Document Revised: 07/07/2017 Document Reviewed: 03/25/2015  © 2017 Elsevier

## 2018-06-19 NOTE — PROGRESS NOTES
"Subjective:      Thania Hilliard is a 33 y.o. female who presents with Head Injury (WC sign fell on head); Neck Injury; and Back Injury        DOI: 6/15/19- patient is 33-year-old female who presents for Workmen's Comp. follow-up today. Patient was at [employer] setting up for a table for graduation -she notes that there was a large rena of wind blew her fliers  off of the table while she was leaning over a circular metal sign fell and hit the back of her head, upper neck and back. Uncertain the weight however it was \"heavy\"/.  Patient denies any loss of consciousness and \"felt okay at the time\". She reports approximately 20 minutes later she began to have a headache and began to vomit. Patient was evaluated by an EMT of which patient refused transport at that time to the emergency room. Later in the evening as patient's vomiting did not subside she sought further evaluation at [facility]. Patient had a negative head CT and a negative CT cervical spine, and a negative lumbar x-ray. Patient reports that she was given Zofran for her vomiting, Valium, and a \"anti-dizziness medication\", and discharged with concussion.  Patient reports minimal relief of vomiting with Zofran as patient has been vomiting daily since the injury. She continues to report intermittent headache, neck pain, lower right-sided back pain, and residual vomiting. She reports that she has been vomiting since 8:00 this morning. She did take the Zofran ODT. She denies any blood in her vomit. Patient currently has a right-sided headache with at her mid blurry vision-however none at this time. Patient was encouraged to have urgent care visit today for Workmen's Comp. follow-up.    Head Injury          ROS       Objective:     /80   Pulse 71   Temp 37.2 °C (99 °F)   Resp 16   Ht 1.702 m (5' 7\")   Wt 79.7 kg (175 lb 9.6 oz)   SpO2 99%   BMI 27.50 kg/m²    PMH:  has a past medical history of Abdominal pain; Amenorrhea; Elevated liver " function tests; Hashimoto's thyroiditis; Hypothyroidism; Ovarian cyst; and Splenomegaly. She also has no past medical history of ASTHMA or Diabetes.  MEDS:   Current Outpatient Prescriptions:   •  LEVOTHYROXINE SODIUM, Take 225 mcg by mouth every day., Disp: , Rfl:   ALLERGIES:   Allergies   Allergen Reactions   • Other Food Anaphylaxis     Walnuts , cantaloupe , coconut , watermelon .  RXN=began as adult   • Promethazine Hcl Unspecified     Hallucinations  RXN=20 years ago   • Tape Unspecified     Blistering  And peeling of skin  Paper tape ok     SURGHX:   Past Surgical History:   Procedure Laterality Date   • FOOT ORIF Right 8/8/2017    Procedure: FOOT ORIF NAVICULAR ;  Surgeon: Patrick Whitten M.D.;  Location: SURGERY [facility];  Service:    • GASTROSCOPY WITH BIOPSY  9/30/08    Performed by CAITLIN JONES at [facility]   • ERCP-DIAGNOSTIC  9/30/08    Performed by CAITLIN JONES at [facility]   • DIANDRA BY LAPAROSCOPY     • OTHER ORTHOPEDIC SURGERY      right ankle repair 2015     SOCHX:  reports that she has never smoked. She has never used smokeless tobacco.  FH: Family history was reviewed, no pertinent findings to report    Physical Exam   Constitutional: She is oriented to person, place, and time. She appears well-developed and well-nourished.   HENT:   Head: Head is without raccoon's eyes and without Copeland's sign.       Right Ear: External ear normal.   Left Ear: External ear normal.   Nose: Nose normal.   Mouth/Throat: Oropharynx is clear and moist. No oropharyngeal exudate.   Tenderness along the right upper trapezius and occiput. Without scalp hematoma or other skin changes.      Eyes: EOM are normal. Pupils are equal, round, and reactive to light.   Neck: Normal range of motion. Neck supple.   Cardiovascular: Normal rate and regular rhythm.    No murmur heard.  Pulmonary/Chest: Effort normal and breath sounds normal. No respiratory distress.   Abdominal:   Vomiting  throughout the duration of the visit today.    Musculoskeletal: Normal range of motion. She exhibits no edema.   Lymphadenopathy:     She has no cervical adenopathy.   Neurological: She is alert and oriented to person, place, and time. She displays normal reflexes. No cranial nerve deficit. She exhibits normal muscle tone. Coordination normal.   Slow Finger to nose, neg. Pronator drift, neg. Rhomberg. BRANDON's appropriate- however slow. Gait unsteady.    Skin: Skin is warm. No rash noted.   Psychiatric: She has a normal mood and affect. She is slowed.   Tearful     Vitals reviewed.               Assessment/Plan:     1. Post concussive syndrome  2. Injury of head, subsequent encounter  3. Intractable vomiting with nausea, unspecified vomiting type    Pt sent to the ER due to intractable nature of vomiting- and inability to tolerate PO fluids. Pt had Zofran ODT approx. 3 hours PTA, and was unable to tolerate any PO fluids after an hour of Zofran IM.     I called and spoke with Dr. Izquierdo regarding this patient- and he agrees that ED evaluation is warranted.   Pt. Was unable to reach a  for transport and requested [facility]. [facility] was called.   Please see D39 for further information.   Pt. Was transferred for further evaluation to the ED- at time of Transport patient uncertain which ED she wants to go to- pt given all her documents for work comp. Along with documents from [place].

## 2018-06-19 NOTE — LETTER
[facility], EMERGENCY DEPT   [address], [place] [address]  Phone: Dept: [phone] - Fax:        [facility] Progress Report and Disability Certification  Date of Service: 6/19/2018   No Show:  No  Date / Time of Next Visit: 6/25/2018   Claim Information   Patient Name: Thania Hilliard  Claim Number:     Employer:  [employer]  Date of Injury: 6/15/2018     Insurer / TPA: [employer] ID / SSN:     Occupation:   Diagnosis: Diagnoses of Post concussion syndrome and Hypocalcemia were pertinent to this visit.    Medical Information   Related to Industrial Injury? Yes ***   Subjective Complaints:  Headache, intractable vomiting, neck pain   Objective Findings: Vomiting, hypocalcemia   Pre-Existing Condition(s): Hypothyroid   Assessment:   Condition Worsened    Status: Additional Care Required  Permanent Disability:No    Plan: MedicationDiagnostics    Diagnostics: Laboratory    Comments:       Disability Information   Status: Temporarily Totally Disabled    From:  6/19/2018  Through: 6/25/2018 Restrictions are: Temporary   Physical Restrictions   Sitting:    Standing:    Stooping:    Bending:      Squatting:    Walking:    Climbing:    Pushing:      Pulling:    Other:    Reaching Above Shoulder (L):   Reaching Above Shoulder (R):       Reaching Below Shoulder (L):    Reaching Below Shoulder (R):      Not to exceed Weight Limits   Carrying(hrs):   Weight Limit(lb):   Lifting(hrs):   Weight  Limit(lb):     Comments:      Repetitive Actions   Hands: i.e. Fine Manipulations from Grasping:     Feet: i.e. Operating Foot Controls:     Driving / Operate Machinery:     Physician Name: Monique Vasquez Physician Signature: MONIQUE White M.D. e-Signature:  , Medical Director   Clinic Name / Location: [facility] [place], EMERGENCY DEPT  [address] [address] [phone]     Clinic Phone Number: Dept: [phone]   Appointment Time:  Visit Start Time:    Check-In Time:  1:20 PM Visit Discharge Time:    Original-Treating Physician or Chiropractor    Page 2-Insurer/TPA    Page 3-Employer    Page 4-Employee

## 2018-06-19 NOTE — ED NOTES
PT DISCHARGED HOME, SKIN PWD, GAIT STEADY, A AND X O 3, IN NAD.  PT VERBALIZED UNDERSTANDING OF DISCHARGE INSTRUCTIONS, PRESCRIPTIONS GIVEN.

## 2018-06-19 NOTE — ED PROVIDER NOTES
ED Provider Note    Scribed for Renita Vasquez M.D. by Tyler Alanis. 6/19/2018, 1:43 PM.    Primary care provider: Heather Bean D.O.  Means of arrival: EMS  History obtained from: Patient  History limited by: None    CHIEF COMPLAINT  Chief Complaint   Patient presents with   • Nausea     A LARGE SIGN FELL ON THE PT'S HEAD ON FRIDAY AT WORK.  SHE WENT TO [facility] AND HAD A NEGATIVE CT SCAN.  WAS SEEN AT URGENT CARE TODAY FOR A RECHECK AND BECAUSE SHE WAS FEELING NAUSEOUS AND THEY SENT HER HERE FOR UNCONTROLLED NAUSEA   • Head Injury       HPI  Thania Hilliard is a 33 y.o. female who presents to the Emergency Department after she was referred here from Urgent Care. Patient was volunteering at a [employer] graduation four days ago and it was very windy outside. A sign fell from the ceiling and hit her on the back of the head. She was feeling very dizzy and weak after the accident. She denies loss of consciousness. Patient was seen in the [place] ED and received CT head and cervical spine  that were normal. She was told that she could return to work and was prescribed Valium. She took the Valium for the first time yesterday evening out of concern for her nursing child. Since discharge from [place] she has been experiencing a headache which she has been managing with Advil as well as severe nausea, vomiting, weakness, and dizziness. She experiences emesis after any PO intake. Yesterday she could not move from from the fetal position secondary to the severity of her headache. Her headache was sharp in quality yesterday. Patient was seen by her chiropractor yesterday. She did not state whether or not she received manipulation. Patient last took her Advil at 7:30 AM this morning. She experienced diarrhea this morning. Later this morning she was called by Workman's Compensation and was asked to present to Urgent Care. At Urgent Care she was given Antivert for tinnitus as well as IM Zofran for her  vomiting. The vomiting did not resolve and the physician at Urgent Care was prompted to call EMS. EMS administered IV Zofran en route here. Her vomiting resolved upon arriving here. She denies fever or rash.  Patient has not been taking stronger pain medication because she is currently nursing her daughter. Patient is allergic to Phenergan and experiences hallucinations.     REVIEW OF SYSTEMS  Pertinent positives include dizziness, weakness, headache, severe nausea, vomiting, decreased PO intake, and diarrhea. Pertinent negatives include no loss of consciousness, fever, or rash. As above, all other systems reviewed and are negative.   See HPI for further details.   C    PAST MEDICAL HISTORY   has a past medical history of Abdominal pain; Amenorrhea; Elevated liver function tests; Hashimoto's thyroiditis; Hypothyroidism; Ovarian cyst; and Splenomegaly.    SURGICAL HISTORY  Past Surgical History:   Procedure Laterality Date   • FOOT ORIF Right 8/8/2017    Procedure: FOOT ORIF NAVICULAR ;  Surgeon: Patrick Whitten M.D.;  Location: SURGERY [facility];  Service:    • GASTROSCOPY WITH BIOPSY  9/30/08    Performed by CAITLIN JONES at [facility]   • ERCP-DIAGNOSTIC  9/30/08    Performed by CAITLIN JONES at [facility]   • DIANDRA BY LAPAROSCOPY     • OTHER ORTHOPEDIC SURGERY      right ankle repair 2015       SOCIAL HISTORY  Social History   Substance Use Topics   • Smoking status: Never Smoker   • Smokeless tobacco: Never Used   • Alcohol use Not on file      History   Drug use: Unknown       FAMILY HISTORY  History reviewed. No pertinent family history.    CURRENT MEDICATIONS  Home Medications     Reviewed by Justice Rodas R.N. (Registered Nurse) on 06/19/18 at 1328  Med List Status: Not Addressed   Medication Last Dose Status   LEVOTHYROXINE SODIUM 6/19/2018 Active                ALLERGIES  Allergies   Allergen Reactions   • Other Food Anaphylaxis     Walnuts , cantaloupe , coconut ,  "watermelon .  RXN=began as adult   • Promethazine Hcl Unspecified     Hallucinations  RXN=20 years ago   • Tape Unspecified     Blistering  And peeling of skin  Paper tape ok       PHYSICAL EXAM  VITAL SIGNS: /74   Pulse 72   Temp 36.4 °C (97.5 °F)   Resp 16   Ht 1.702 m (5' 7\")   Wt 77.1 kg (170 lb)   BMI 26.63 kg/m²   Vitals reviewed.  Consitutional: Well-developed, well-nourished. Slow gait. Tearful  HENT: Normocephalic, right external ear normal, left external ear normal, oropharynx dry.  Eyes: PERRLA at 5 mm, Conjunctiva injected with tears, extraocular movements normal. Negative for: discharge in right and left eye, icterus.  Neck: Range of motion normal, supple. Tender right cervical paraspinous muscles. Negative for cervical adenopathy.  Cardiovascular: Normal rate, regular rhythm, heart sounds normal, intact distal pulses. Negative for: murmur, rub, gallop.  Pulmonary/Chest Wall: Effort normal, breath sounds normal. Negative for: respiratory distress, wheezes, rales, rhonchi.   Abdominal: Soft, bowel sounds normal. Negative for: distention, tenderness, rebound, guarding.  Musculoskeletal: Normal range of motion. Tender right trapezius muscle. Negative for edema.   Neurological: Alert and oriented x3. No focal deficits. 5/5 strength against gravity extremities x4. Sensation intact to touch in the right deltoid.  Skin: Warm, dry. Negative for rash.  Psych: Tearful, behavior normal, judgment normal.    DIAGNOSTIC STUDIES / PROCEDURES    LABS  Results for orders placed or performed during the hospital encounter of 06/19/18   COMP METABOLIC PANEL   Result Value Ref Range    Sodium 141 135 - 145 mmol/L    Potassium 3.7 3.6 - 5.5 mmol/L    Chloride 107 96 - 112 mmol/L    Co2 25 20 - 33 mmol/L    Anion Gap 9.0 0.0 - 11.9    Glucose 89 65 - 99 mg/dL    Bun 17 8 - 22 mg/dL    Creatinine 0.79 0.50 - 1.40 mg/dL    Calcium 6.9 (LL) 8.5 - 10.5 mg/dL    AST(SGOT) 15 12 - 45 U/L    ALT(SGPT) 13 2 - 50 U/L    " Alkaline Phosphatase 53 30 - 99 U/L    Total Bilirubin 0.4 0.1 - 1.5 mg/dL    Albumin 4.0 3.2 - 4.9 g/dL    Total Protein 6.1 6.0 - 8.2 g/dL    Globulin 2.1 1.9 - 3.5 g/dL    A-G Ratio 1.9 g/dL   LIPASE   Result Value Ref Range    Lipase 23 11 - 82 U/L   ESTIMATED GFR   Result Value Ref Range    GFR If African American >60 >60 mL/min/1.73 m 2    GFR If Non African American >60 >60 mL/min/1.73 m 2     All labs reviewed by me.    COURSE & MEDICAL DECISION MAKING  Nursing notes, VS, PMSFHx reviewed in chart.    1:43 PM - Patient seen and examined at bedside. The patient presents with headache and neck pain after trauma and the differential diagnosis includes but is not limited to postconcussive syndrome, dehydration. Ordered for CMP and lipase to evaluate. I reviewed her CT head and C spine in the exam room, which were negative per the [facility] radiologist. Patient will be treated with NS infusion 1 liter for vomiting, headache, and clinical evidence of dehydration as well as metoclopramide injection 10 mg and ketorolac injection 15 mg for her symptoms.      3:37 PM - Re-examined; The patient is resting in bed comfortably and her headache and nausea have resolved. Patient is well hydrated after receiving IV fluids. I discussed her above findings and plans for discharge with a prescription for Toradol and Reglan. She was given a referral to her primary care for a recheck calcium in one week and [facility] and instructed to return to the ED if her symptoms worsen. Patient understands and agrees.    I reviewed prescription monitoring program for patient's narcotic use before prescribing a scheduled drug.The patient will not drink alcohol nor drive with prescribed medications. The patient will return for new or worsening symptoms and is stable at the time of discharge.      DISPOSITION:  Patient will be discharged home in stable condition.    FOLLOW UP:  Heather Bean D.O.  [address]  [id number]  [address]  [place] [address]  [phone]    Call in 1 day  for re-check of calcium next week. Take 2 tums twice a day until then.    [facility]  [address] [address]  [phone]    Call today  for re-check later this week      OUTPATIENT MEDICATIONS:  New Prescriptions    KETOROLAC (TORADOL) 10 MG TAB    Take 1 Tab by mouth every 6 hours as needed for Moderate Pain or Severe Pain.    METOCLOPRAMIDE (REGLAN) 10 MG TAB    Take 1 Tab by mouth 4 times a day as needed (nausea).       FINAL IMPRESSION  1. Post concussion syndrome    2. Hypocalcemia          Tyler TIJERINA (Scribe), am scribing for, and in the presence of, Renita Vasquez M.D..    Electronically signed by: Tyler Alanis (Scribe), 6/19/2018    Renita TIJERINA M.D. personally performed the services described in this documentation, as scribed by yTler Alanis in my presence, and it is both accurate and complete.    The note accurately reflects work and decisions made by me.  Renita Vasquez  6/19/2018  9:37 PM
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
## 2019-01-24 ENCOUNTER — APPOINTMENT (RX ONLY)
Dept: URBAN - METROPOLITAN AREA CLINIC 22 | Facility: CLINIC | Age: 34
Setting detail: DERMATOLOGY
End: 2019-01-24

## 2019-01-24 DIAGNOSIS — D22 MELANOCYTIC NEVI: ICD-10-CM

## 2019-01-24 DIAGNOSIS — L72.8 OTHER FOLLICULAR CYSTS OF THE SKIN AND SUBCUTANEOUS TISSUE: ICD-10-CM

## 2019-01-24 DIAGNOSIS — L81.4 OTHER MELANIN HYPERPIGMENTATION: ICD-10-CM

## 2019-01-24 DIAGNOSIS — D18.0 HEMANGIOMA: ICD-10-CM

## 2019-01-24 DIAGNOSIS — Z71.89 OTHER SPECIFIED COUNSELING: ICD-10-CM

## 2019-01-24 PROBLEM — D22.61 MELANOCYTIC NEVI OF RIGHT UPPER LIMB, INCLUDING SHOULDER: Status: ACTIVE | Noted: 2019-01-24

## 2019-01-24 PROBLEM — D48.5 NEOPLASM OF UNCERTAIN BEHAVIOR OF SKIN: Status: ACTIVE | Noted: 2019-01-24

## 2019-01-24 PROBLEM — D22.62 MELANOCYTIC NEVI OF LEFT UPPER LIMB, INCLUDING SHOULDER: Status: ACTIVE | Noted: 2019-01-24

## 2019-01-24 PROBLEM — D22.5 MELANOCYTIC NEVI OF TRUNK: Status: ACTIVE | Noted: 2019-01-24

## 2019-01-24 PROBLEM — D18.01 HEMANGIOMA OF SKIN AND SUBCUTANEOUS TISSUE: Status: ACTIVE | Noted: 2019-01-24

## 2019-01-24 PROCEDURE — ? BIOPSY BY SHAVE METHOD

## 2019-01-24 PROCEDURE — ? REFERRAL

## 2019-01-24 PROCEDURE — ? COUNSELING

## 2019-01-24 PROCEDURE — ? ADDITIONAL NOTES

## 2019-01-24 PROCEDURE — 11102 TANGNTL BX SKIN SINGLE LES: CPT

## 2019-01-24 PROCEDURE — 99203 OFFICE O/P NEW LOW 30 MIN: CPT | Mod: 25

## 2019-01-24 ASSESSMENT — LOCATION DETAILED DESCRIPTION DERM
LOCATION DETAILED: RIGHT PROXIMAL DORSAL FOREARM
LOCATION DETAILED: LEFT SUPERIOR LATERAL MIDBACK
LOCATION DETAILED: RIGHT SUPERIOR MEDIAL MIDBACK
LOCATION DETAILED: UMBILICUS
LOCATION DETAILED: LEFT DISTAL DORSAL FOREARM
LOCATION DETAILED: EPIGASTRIC SKIN
LOCATION DETAILED: LEFT PROXIMAL DORSAL FOREARM
LOCATION DETAILED: RIGHT SUPERIOR UPPER BACK

## 2019-01-24 ASSESSMENT — LOCATION SIMPLE DESCRIPTION DERM
LOCATION SIMPLE: ABDOMEN
LOCATION SIMPLE: RIGHT FOREARM
LOCATION SIMPLE: RIGHT UPPER BACK
LOCATION SIMPLE: LEFT LOWER BACK
LOCATION SIMPLE: LEFT FOREARM
LOCATION SIMPLE: RIGHT LOWER BACK

## 2019-01-24 ASSESSMENT — LOCATION ZONE DERM
LOCATION ZONE: ARM
LOCATION ZONE: TRUNK

## 2019-01-24 NOTE — PROCEDURE: BIOPSY BY SHAVE METHOD
Bill For Surgical Tray: no
Cryotherapy Text: The wound bed was treated with cryotherapy after the biopsy was performed.
Curettage Text: The wound bed was treated with curettage after the biopsy was performed.
Additional Anesthesia Volume In Cc (Will Not Render If 0): 0
Lab: [id number]
Consent: Written consent was obtained and risks were reviewed including but not limited to scarring, infection, bleeding, scabbing, incomplete removal, nerve damage and allergy to anesthesia.
Electrodesiccation Text: The wound bed was treated with electrodesiccation after the biopsy was performed.
Anesthesia Volume In Cc: 1
Lab Facility: 
Biopsy Type: H and E
Depth Of Biopsy: dermis
Render Post-Care Instructions In Note?: yes
Wound Care: Vaseline
Electrodesiccation And Curettage Text: The wound bed was treated with electrodesiccation and curettage after the biopsy was performed.
Detail Level: Detailed
Post-Care Instructions: I reviewed with the patient in detail post-care instructions. Patient is to keep the biopsy site dry overnight, and then apply bacitracin twice daily until healed. Patient may apply hydrogen peroxide soaks to remove any crusting.
Type Of Destruction Used: Curettage
Notification Instructions: Patient will be notified of biopsy results. However, patient instructed to call the office if not contacted within 2 weeks.
Anesthesia Type: 1% lidocaine with 1:100,000 epinephrine and a 1:3 solution of 8.4% sodium bicarbonate
Billing Type: Third-Party Bill
Dressing: bandage
Biopsy Method: Personna blade
Hemostasis: Drysol
Silver Nitrate Text: The wound bed was treated with silver nitrate after the biopsy was performed.

## 2019-12-31 NOTE — IP AVS SNAPSHOT
8/8/2017    Thania Hilliard  [address] [place] [address]    Dear Thania:    [facility] wants to ensure your discharge home is safe and you or your loved ones have had all of your questions answered regarding your care after you leave the hospital.    Below is a list of resources and contact information should you have any questions regarding your hospital stay, follow-up instructions, or active medical symptoms.    Questions or Concerns Regarding… Contact   Medical Questions Related to Your Discharge  (7 days a week, 8am-5pm) Contact a Nurse Care Coordinator   [phone]   Medical Questions Not Related to Your Discharge  (24 hours a day / 7 days a week)  Contact the Nurse Health Line   [phone]    Medications or Discharge Instructions Refer to your discharge packet   or contact your [facility] Primary Care Provider   [phone]   Follow-up Appointment(s) Schedule your appointment via [employer]   or contact Scheduling [phone]   Billing Review your statement via [employer]  or contact Billing [phone]   Medical Records Review your records via [employer]   or contact Medical Records [phone]     You may receive a telephone call within two days of discharge. This call is to make certain you understand your discharge instructions and have the opportunity to have any questions answered. You can also easily access your medical information, test results and upcoming appointments via the [employer] free online health management tool. You can learn more and sign up at [employer]. For assistance setting up your [employer] account, please call [phone].    Once again, we want to ensure your discharge home is safe and that you have a clear understanding of any next steps in your care. If you have any questions or concerns, please do not hesitate to contact us, we are here for you. Thank you for choosing [facility] for your healthcare needs.    Sincerely,    Your [facility] Healthcare Team          negative

## 2021-05-27 DIAGNOSIS — L50.1 CHRONIC IDIOPATHIC URTICARIA: ICD-10-CM

## 2021-06-01 ENCOUNTER — OUTPATIENT INFUSION SERVICES (OUTPATIENT)
Dept: ONCOLOGY | Facility: MEDICAL CENTER | Age: 36
End: 2021-06-01
Attending: ALLERGY & IMMUNOLOGY
Payer: COMMERCIAL

## 2021-06-01 VITALS
TEMPERATURE: 98.1 F | OXYGEN SATURATION: 97 % | RESPIRATION RATE: 18 BRPM | HEART RATE: 87 BPM | BODY MASS INDEX: 26.06 KG/M2 | HEIGHT: 67 IN | DIASTOLIC BLOOD PRESSURE: 71 MMHG | SYSTOLIC BLOOD PRESSURE: 115 MMHG | WEIGHT: 166.01 LBS

## 2021-06-01 DIAGNOSIS — L50.1 CHRONIC IDIOPATHIC URTICARIA: ICD-10-CM

## 2021-06-01 RX ORDER — CETIRIZINE HYDROCHLORIDE 10 MG/1
10 TABLET ORAL 3 TIMES DAILY
COMMUNITY
End: 2024-02-01

## 2021-06-02 NOTE — PROGRESS NOTES
Pt arrives to [facility] for first dose of Xolair for chronic urticaria.  Pt denies s/sx of infection.  Pt denies any current rash/hives.  Pt reports sinus congestion and runny nose.  Discussed plan of care and Xolair handout given to pt.  Pt presented with Epi pen and observed Epi pen  May 2021.  Pt reports she purchased 6 Epi pens in 2020 due to serious walnut reaction.  Explained to pt that this nurse is unable to administer Xolair today due to  Epi pen.  Advised pt to call her pharmacy to get refill ASAP.  Pt verbalizes understanding and agrees with plan.  Infusion scheduling phone number provided to pt.  Pt dc home to self care.

## 2021-08-10 ENCOUNTER — OUTPATIENT INFUSION SERVICES (OUTPATIENT)
Dept: ONCOLOGY | Facility: MEDICAL CENTER | Age: 36
End: 2021-08-10
Attending: ALLERGY & IMMUNOLOGY
Payer: COMMERCIAL

## 2021-08-10 VITALS
BODY MASS INDEX: 26.89 KG/M2 | WEIGHT: 167.33 LBS | RESPIRATION RATE: 18 BRPM | HEART RATE: 84 BPM | HEIGHT: 66 IN | SYSTOLIC BLOOD PRESSURE: 122 MMHG | OXYGEN SATURATION: 98 % | DIASTOLIC BLOOD PRESSURE: 72 MMHG | TEMPERATURE: 97.9 F

## 2021-08-10 DIAGNOSIS — L50.1 CHRONIC IDIOPATHIC URTICARIA: ICD-10-CM

## 2021-08-10 PROCEDURE — 96372 THER/PROPH/DIAG INJ SC/IM: CPT

## 2021-08-10 PROCEDURE — 306780 HCHG STAT FOR TRANSFUSION PER CASE

## 2021-08-10 PROCEDURE — 700111 HCHG RX REV CODE 636 W/ 250 OVERRIDE (IP): Performed by: ALLERGY & IMMUNOLOGY

## 2021-08-10 RX ORDER — EPINEPHRINE 0.3 MG/.3ML
INJECTION SUBCUTANEOUS
COMMUNITY
Start: 2021-07-01

## 2021-08-10 RX ORDER — LEVOTHYROXINE SODIUM 0.2 MG/1
TABLET ORAL
COMMUNITY
Start: 2021-05-21 | End: 2024-02-01

## 2021-08-10 RX ORDER — PREGABALIN 75 MG/1
CAPSULE ORAL
COMMUNITY
Start: 2021-05-17 | End: 2022-12-22

## 2021-08-10 RX ADMIN — OMALIZUMAB 300 MG: 150 INJECTION, SOLUTION SUBCUTANEOUS at 15:55

## 2021-08-10 NOTE — PROGRESS NOTES
Thania arrives for first time xolair injection.  Thania has an epi pen with her if needed, that expires 05/2022. Xolair given subcutaneously in bilateral upper back arms, bandaid declined at sites. Thania tolerated well and without incident. Thania then observed for 61 minutes post injection. Thania aware to stay for full 90 minutes observation, report given to Jacklyn RN at [phone]. Next appointment scheduled, Thania resting comfortably at this time.

## 2021-08-11 NOTE — PROGRESS NOTES
Received report from MIESHA Britt.  Patient completed observation for first time Xolair.  No s/s reaction noted.  Patient ambulated out of clinic in no apparent distress.

## 2021-09-07 ENCOUNTER — OUTPATIENT INFUSION SERVICES (OUTPATIENT)
Dept: ONCOLOGY | Facility: MEDICAL CENTER | Age: 36
End: 2021-09-07
Attending: ALLERGY & IMMUNOLOGY
Payer: COMMERCIAL

## 2021-09-07 VITALS
HEIGHT: 66 IN | RESPIRATION RATE: 18 BRPM | DIASTOLIC BLOOD PRESSURE: 74 MMHG | BODY MASS INDEX: 27.64 KG/M2 | HEART RATE: 94 BPM | WEIGHT: 171.96 LBS | OXYGEN SATURATION: 95 % | TEMPERATURE: 98.8 F | SYSTOLIC BLOOD PRESSURE: 116 MMHG

## 2021-09-07 DIAGNOSIS — L50.1 CHRONIC IDIOPATHIC URTICARIA: ICD-10-CM

## 2021-09-07 PROCEDURE — 96372 THER/PROPH/DIAG INJ SC/IM: CPT

## 2021-09-07 PROCEDURE — 700111 HCHG RX REV CODE 636 W/ 250 OVERRIDE (IP): Performed by: ALLERGY & IMMUNOLOGY

## 2021-09-07 RX ADMIN — OMALIZUMAB 300 MG: 150 INJECTION, SOLUTION SUBCUTANEOUS at 15:41

## 2021-09-07 NOTE — PROGRESS NOTES
Patient presents for second xolair injection. Epi-pen verified, expires 5/2022. Injection given one to back of each arm. Patient monitored for 90 minutes after injection, no signs or symptoms of reaction. Next appointment confirmed. Patient discharged to self care in no apparent distress.

## 2022-03-03 NOTE — PROCEDURE: ADDITIONAL NOTES
Additional Notes: Clean area with Hibaclense otc daily.
Detail Level: Zone
social isolation  fears of abandonment  over thinking

## 2022-12-20 ENCOUNTER — TELEPHONE (OUTPATIENT)
Dept: SCHEDULING | Facility: IMAGING CENTER | Age: 37
End: 2022-12-20

## 2022-12-22 ENCOUNTER — OFFICE VISIT (OUTPATIENT)
Dept: MEDICAL GROUP | Facility: PHYSICIAN GROUP | Age: 37
End: 2022-12-22
Payer: COMMERCIAL

## 2022-12-22 VITALS
SYSTOLIC BLOOD PRESSURE: 112 MMHG | OXYGEN SATURATION: 98 % | DIASTOLIC BLOOD PRESSURE: 66 MMHG | RESPIRATION RATE: 16 BRPM | BODY MASS INDEX: 28.6 KG/M2 | HEART RATE: 73 BPM | WEIGHT: 182.2 LBS | HEIGHT: 67 IN | TEMPERATURE: 98.7 F

## 2022-12-22 DIAGNOSIS — Z11.59 ENCOUNTER FOR HEPATITIS C SCREENING TEST FOR LOW RISK PATIENT: ICD-10-CM

## 2022-12-22 DIAGNOSIS — Z00.00 HEALTHCARE MAINTENANCE: ICD-10-CM

## 2022-12-22 DIAGNOSIS — E03.9 ACQUIRED HYPOTHYROIDISM: ICD-10-CM

## 2022-12-22 DIAGNOSIS — Z91.018 MULTIPLE FOOD ALLERGIES: ICD-10-CM

## 2022-12-22 DIAGNOSIS — E83.51 HYPOCALCEMIA: ICD-10-CM

## 2022-12-22 PROCEDURE — 99204 OFFICE O/P NEW MOD 45 MIN: CPT | Performed by: STUDENT IN AN ORGANIZED HEALTH CARE EDUCATION/TRAINING PROGRAM

## 2022-12-22 RX ORDER — LEVOTHYROXINE SODIUM 0.2 MG/1
TABLET ORAL
COMMUNITY
Start: 2021-05-21 | End: 2022-12-22

## 2022-12-22 ASSESSMENT — FIBROSIS 4 INDEX: FIB4 SCORE: 0.64

## 2022-12-22 ASSESSMENT — PATIENT HEALTH QUESTIONNAIRE - PHQ9: CLINICAL INTERPRETATION OF PHQ2 SCORE: 0

## 2022-12-22 NOTE — PROGRESS NOTES
Subjective:     CC:  Diagnoses of Acquired hypothyroidism, Multiple food allergies, Hypocalcemia, Encounter for hepatitis C screening test for low risk patient, and Healthcare maintenance were pertinent to this visit.    HISTORY OF THE PRESENT ILLNESS: Patient is a 37 y.o. female.  This patient is new to me today.  She sees Dr. Baldwin for her women's health and Dr. Guzman for endocrinology needs this pleasant patient is here today to discuss:    1. Acquired hypothyroidism  Thyroidectomy at age 17 due to unknown causes.  Patient is compliant with her levothyroxine 200 mcg 6 days a week with abstinence on the seventh day.  Other than recent weight gain she reports no symptoms of hyper/hypothyroidism.    2. Multiple food allergies  Patient keeps an EpiPen on hand for anaphylaxis.    3. Hypocalcemia  Chronic hypocalcemia.  Patient has no additional information to offer on this.  She is unaware of the postsurgical status of her parathyroid glands.    4. Encounter for hepatitis C screening test for low risk patient  Not at increased risk.    5. Healthcare maintenance  Regular exercise.  Regular dentistry.  Patient is working hard at maintaining a healthy diet    Active Diagnosis:    Patient Active Problem List   Diagnosis    Closed nondisplaced fracture of navicular bone of right foot    Chronic idiopathic urticaria    Acquired hypothyroidism    Multiple food allergies      Current Outpatient Medications Ordered in Epic   Medication Sig Dispense Refill    EPINEPHrine (EPIPEN) 0.3 MG/0.3ML Solution Auto-injector solution for injection USE AS DIRECTED FOR SEVERE ALLERGIC REACTION      levothyroxine (SYNTHROID) 200 MCG Tab LEVOXYL 200 MCG TABS      cetirizine (ZYRTEC) 10 MG Tab Take 10 mg by mouth in the morning, at noon, and at bedtime.       No current Epic-ordered facility-administered medications on file.     ROS:   Gen: No fevers/chills, no changes in weight  HEENT: No changes in vision/hearing, sore throat.  Pulm: No  "cough, unexplained SOB.  CV: No chest pain/pressure, no palpitations  GI: No nausea/vomiting, no diarrhea  : No dysuria/nocturia  MSk: No myalgias  Skin: No rash/skin changes  Neuro: No headaches, no numbness/tingling  Heme/Lymph: no easy bruising      Objective:     Exam: /66 (BP Location: Left arm, Patient Position: Sitting, BP Cuff Size: Adult)   Pulse 73   Temp 37.1 °C (98.7 °F) (Temporal)   Resp 16   Ht 1.702 m (5' 7\")   Wt 82.6 kg (182 lb 3.2 oz)   SpO2 98%  Body mass index is 28.54 kg/m².    General: Normal appearing. No distress.  HEENT: Normocephalic. Eyes conjunctiva clear lids without ptosis. Pupils equal and reactive to light accommodation. Ears normal shape and contour. Canals are clear bilaterally, tympanic membranes are benign. Nasal mucosa benign, oropharynx is without erythema, edema or exudates.   Neck: Supple without JVD. Thyroid is not enlarged.  Pulmonary: Clear to ausculation.  Normal effort. No rales, ronchi, or wheezing.  Cardiovascular: Regular rate and rhythm without murmur. Radial pulses are intact and equal bilaterally.  Abdomen: Nondistended   neurologic: Grossly nonfocal.  CN II through XII intact.  Lymph: No cervical or supraclavicular lymph nodes are palpable  Skin: Warm and dry.  No obvious lesions.  Musculoskeletal: Normal gait. No extremity cyanosis, clubbing, or edema.  Psych: Normal mood and affect. Alert and oriented x3. Judgment and insight are normal.    A chaperone was offered to the patient during today's exam. Patient declined chaperone.    Labs:   Patient supplied labs from 12/12/2022:  -CMP showing BUN elevated at 22, calcium 7.6  -Lipid profile showing total cholesterol 145, triglycerides 69, HDL 55, LDL 76  -Hemoglobin A1c 4.9%  -TSH 0.022, patient confirms this was on her current dose of levothyroxine.    Assessment & Plan:   37 y.o. female with the following -    1. Acquired hypothyroidism  -Chronic, unstable.  -Patient is following with " [facility].    2. Multiple food allergies  -Chronic.  -Patient does have a up-to-date EpiPen on hand.    3. Hypocalcemia  -Undiagnosed problem.  -Likely hypoparathyroidism.  - PTH INTACT (PTH ONLY); Future  - IONIZED CALCIUM; Future    4. Encounter for hepatitis C screening test for low risk patient  - HCV Scrn ( [id number] 1xLife); Future    5. Healthcare maintenance  - CBC WITHOUT DIFFERENTIAL; Future  -Patient declines influenza vaccine today.    Return in about 1 year (around 2023).    Please note that this dictation was created using voice recognition software. I have made every reasonable attempt to correct obvious errors, but I expect that there are errors of grammar and possibly content that I did not discover before finalizing the note.      Ruddy Terrazas PA-C 2022

## 2023-01-09 LAB
CA-I SERPL ISE-MCNC: 4.3 MG/DL (ref 4.5–5.6)
ERYTHROCYTE [DISTWIDTH] IN BLOOD BY AUTOMATED COUNT: 11.3 % (ref 11.7–15.4)
HCT VFR BLD AUTO: 39.2 % (ref 34–46.6)
HCV RNA SERPL NAA+PROBE-ACNC: NORMAL IU/ML
HCV RNA SERPL NAA+PROBE-LOG IU: NORMAL {LOG_IU}/ML
HGB BLD-MCNC: 13.5 G/DL (ref 11.1–15.9)
MCH RBC QN AUTO: 33.3 PG (ref 26.6–33)
MCHC RBC AUTO-ENTMCNC: 34.4 G/DL (ref 31.5–35.7)
MCV RBC AUTO: 97 FL (ref 79–97)
NRBC BLD AUTO-RTO: ABNORMAL %
PTH-INTACT SERPL-MCNC: 16 PG/ML (ref 15–65)
RBC # BLD AUTO: 4.05 X10E6/UL (ref 3.77–5.28)
TEST INFO 550307: NORMAL
WBC # BLD AUTO: 4.9 X10E3/UL (ref 3.4–10.8)

## 2023-01-10 ENCOUNTER — TELEPHONE (OUTPATIENT)
Dept: MEDICAL GROUP | Facility: PHYSICIAN GROUP | Age: 38
End: 2023-01-10
Payer: COMMERCIAL

## 2023-01-10 DIAGNOSIS — E21.3 HYPERPARATHYROIDISM (HCC): ICD-10-CM

## 2023-01-10 RX ORDER — CALCITRIOL 0.25 UG/1
0.25 CAPSULE, LIQUID FILLED ORAL 2 TIMES DAILY
Qty: 180 CAPSULE | Refills: 1 | Status: SHIPPED | OUTPATIENT
Start: 2023-01-10 | End: 2023-07-10

## 2023-01-10 NOTE — TELEPHONE ENCOUNTER
----- Message from Ruddy Terrazas P.A.-C. sent at 1/10/2023  7:47 AM PST -----  Please contact Mrs. Hilliard and let her know that her recent calcium level was suppressed without an appropriate parathyroid response.  This is suggestive of hypoparathyroidism probably secondary to her surgical removal of her thyroid gland.    I placed an order for calcitriol 0.25 mcg to be taken twice daily.  She should also find a calcium citrate supplement 500 mg to take twice daily along with the calcitriol.    I have placed follow-up labs for on or after 21 February to see if this is the final dosing scheme.    She should have a follow-up appointment shortly after those labs.    Ruddy Terrazas PA-C

## 2023-01-10 NOTE — TELEPHONE ENCOUNTER
Phone Number Called: [phone]     Call outcome: Left detailed message for patient. Informed to call back with any additional questions.    Message: Left vm for patient to call back 1st attempt.

## 2023-03-14 LAB
ERYTHROCYTE [DISTWIDTH] IN BLOOD BY AUTOMATED COUNT: 11.9 % (ref 11.7–15.4)
HCT VFR BLD AUTO: 39.5 % (ref 34–46.6)
HGB BLD-MCNC: 13.4 G/DL (ref 11.1–15.9)
MCH RBC QN AUTO: 32.5 PG (ref 26.6–33)
MCHC RBC AUTO-ENTMCNC: 33.9 G/DL (ref 31.5–35.7)
MCV RBC AUTO: 96 FL (ref 79–97)
NRBC BLD AUTO-RTO: NORMAL %
RBC # BLD AUTO: 4.12 X10E6/UL (ref 3.77–5.28)
WBC # BLD AUTO: 4.5 X10E3/UL (ref 3.4–10.8)

## 2023-08-23 ENCOUNTER — DOCUMENTATION (OUTPATIENT)
Dept: HEALTH INFORMATION MANAGEMENT | Facility: OTHER | Age: 38
End: 2023-08-23
Payer: COMMERCIAL

## 2024-01-29 ENCOUNTER — TELEPHONE (OUTPATIENT)
Dept: MEDICAL GROUP | Facility: PHYSICIAN GROUP | Age: 39
End: 2024-01-29
Payer: COMMERCIAL

## 2024-01-29 DIAGNOSIS — E83.51 HYPOCALCEMIA: ICD-10-CM

## 2024-01-29 SDOH — ECONOMIC STABILITY: INCOME INSECURITY: HOW HARD IS IT FOR YOU TO PAY FOR THE VERY BASICS LIKE FOOD, HOUSING, MEDICAL CARE, AND HEATING?: NOT VERY HARD

## 2024-01-29 SDOH — ECONOMIC STABILITY: INCOME INSECURITY: IN THE LAST 12 MONTHS, WAS THERE A TIME WHEN YOU WERE NOT ABLE TO PAY THE MORTGAGE OR RENT ON TIME?: NO

## 2024-01-29 SDOH — ECONOMIC STABILITY: TRANSPORTATION INSECURITY
IN THE PAST 12 MONTHS, HAS LACK OF RELIABLE TRANSPORTATION KEPT YOU FROM MEDICAL APPOINTMENTS, MEETINGS, WORK OR FROM GETTING THINGS NEEDED FOR DAILY LIVING?: NO

## 2024-01-29 SDOH — ECONOMIC STABILITY: HOUSING INSECURITY
IN THE LAST 12 MONTHS, WAS THERE A TIME WHEN YOU DID NOT HAVE A STEADY PLACE TO SLEEP OR SLEPT IN A SHELTER (INCLUDING NOW)?: NO

## 2024-01-29 SDOH — HEALTH STABILITY: PHYSICAL HEALTH: ON AVERAGE, HOW MANY DAYS PER WEEK DO YOU ENGAGE IN MODERATE TO STRENUOUS EXERCISE (LIKE A BRISK WALK)?: 2 DAYS

## 2024-01-29 SDOH — HEALTH STABILITY: MENTAL HEALTH
STRESS IS WHEN SOMEONE FEELS TENSE, NERVOUS, ANXIOUS, OR CAN'T SLEEP AT NIGHT BECAUSE THEIR MIND IS TROUBLED. HOW STRESSED ARE YOU?: TO SOME EXTENT

## 2024-01-29 SDOH — HEALTH STABILITY: PHYSICAL HEALTH: ON AVERAGE, HOW MANY MINUTES DO YOU ENGAGE IN EXERCISE AT THIS LEVEL?: 30 MIN

## 2024-01-29 SDOH — ECONOMIC STABILITY: FOOD INSECURITY: WITHIN THE PAST 12 MONTHS, THE FOOD YOU BOUGHT JUST DIDN'T LAST AND YOU DIDN'T HAVE MONEY TO GET MORE.: NEVER TRUE

## 2024-01-29 SDOH — ECONOMIC STABILITY: FOOD INSECURITY: WITHIN THE PAST 12 MONTHS, YOU WORRIED THAT YOUR FOOD WOULD RUN OUT BEFORE YOU GOT MONEY TO BUY MORE.: NEVER TRUE

## 2024-01-29 SDOH — ECONOMIC STABILITY: TRANSPORTATION INSECURITY
IN THE PAST 12 MONTHS, HAS THE LACK OF TRANSPORTATION KEPT YOU FROM MEDICAL APPOINTMENTS OR FROM GETTING MEDICATIONS?: NO

## 2024-01-29 SDOH — ECONOMIC STABILITY: TRANSPORTATION INSECURITY
IN THE PAST 12 MONTHS, HAS LACK OF TRANSPORTATION KEPT YOU FROM MEETINGS, WORK, OR FROM GETTING THINGS NEEDED FOR DAILY LIVING?: NO

## 2024-01-29 SDOH — ECONOMIC STABILITY: HOUSING INSECURITY: IN THE LAST 12 MONTHS, HOW MANY PLACES HAVE YOU LIVED?: 1

## 2024-01-29 ASSESSMENT — LIFESTYLE VARIABLES
AUDIT-C TOTAL SCORE: 2
HOW OFTEN DO YOU HAVE A DRINK CONTAINING ALCOHOL: 2-4 TIMES A MONTH
SKIP TO QUESTIONS 9-10: 1
HOW MANY STANDARD DRINKS CONTAINING ALCOHOL DO YOU HAVE ON A TYPICAL DAY: 1 OR 2
HOW OFTEN DO YOU HAVE SIX OR MORE DRINKS ON ONE OCCASION: NEVER

## 2024-01-29 ASSESSMENT — SOCIAL DETERMINANTS OF HEALTH (SDOH)
WITHIN THE PAST 12 MONTHS, YOU WORRIED THAT YOUR FOOD WOULD RUN OUT BEFORE YOU GOT THE MONEY TO BUY MORE: NEVER TRUE
DO YOU BELONG TO ANY CLUBS OR ORGANIZATIONS SUCH AS CHURCH GROUPS UNIONS, FRATERNAL OR ATHLETIC GROUPS, OR SCHOOL GROUPS?: YES
ARE YOU MARRIED, WIDOWED, DIVORCED, SEPARATED, NEVER MARRIED, OR LIVING WITH A PARTNER?: LIVING WITH PARTNER
HOW OFTEN DO YOU HAVE SIX OR MORE DRINKS ON ONE OCCASION: NEVER
IN A TYPICAL WEEK, HOW MANY TIMES DO YOU TALK ON THE PHONE WITH FAMILY, FRIENDS, OR NEIGHBORS?: THREE TIMES A WEEK
HOW OFTEN DO YOU ATTENT MEETINGS OF THE CLUB OR ORGANIZATION YOU BELONG TO?: MORE THAN 4 TIMES PER YEAR
HOW OFTEN DO YOU ATTEND CHURCH OR RELIGIOUS SERVICES?: NEVER
HOW OFTEN DO YOU HAVE A DRINK CONTAINING ALCOHOL: 2-4 TIMES A MONTH
HOW OFTEN DO YOU ATTENT MEETINGS OF THE CLUB OR ORGANIZATION YOU BELONG TO?: MORE THAN 4 TIMES PER YEAR
HOW OFTEN DO YOU ATTEND CHURCH OR RELIGIOUS SERVICES?: NEVER
DO YOU BELONG TO ANY CLUBS OR ORGANIZATIONS SUCH AS CHURCH GROUPS UNIONS, FRATERNAL OR ATHLETIC GROUPS, OR SCHOOL GROUPS?: YES
ARE YOU MARRIED, WIDOWED, DIVORCED, SEPARATED, NEVER MARRIED, OR LIVING WITH A PARTNER?: LIVING WITH PARTNER
HOW OFTEN DO YOU GET TOGETHER WITH FRIENDS OR RELATIVES?: TWICE A WEEK
IN A TYPICAL WEEK, HOW MANY TIMES DO YOU TALK ON THE PHONE WITH FAMILY, FRIENDS, OR NEIGHBORS?: THREE TIMES A WEEK
HOW HARD IS IT FOR YOU TO PAY FOR THE VERY BASICS LIKE FOOD, HOUSING, MEDICAL CARE, AND HEATING?: NOT VERY HARD
HOW MANY DRINKS CONTAINING ALCOHOL DO YOU HAVE ON A TYPICAL DAY WHEN YOU ARE DRINKING: 1 OR 2
HOW OFTEN DO YOU GET TOGETHER WITH FRIENDS OR RELATIVES?: TWICE A WEEK

## 2024-01-29 NOTE — TELEPHONE ENCOUNTER
Labs ordered.    Thank you,    LENA Montenegro  [facility]

## 2024-01-29 NOTE — TELEPHONE ENCOUNTER
Patient left a message last week, about getting labs done prior to her visit with you on Thursday.   She is requesting to get a thyroid panel and calcium.   As she will need to get a refill on her thyroid medication.   Please advise

## 2024-02-01 ENCOUNTER — OFFICE VISIT (OUTPATIENT)
Dept: MEDICAL GROUP | Facility: PHYSICIAN GROUP | Age: 39
End: 2024-02-01
Payer: COMMERCIAL

## 2024-02-01 VITALS
SYSTOLIC BLOOD PRESSURE: 114 MMHG | HEIGHT: 67 IN | TEMPERATURE: 98 F | BODY MASS INDEX: 23.23 KG/M2 | OXYGEN SATURATION: 98 % | DIASTOLIC BLOOD PRESSURE: 70 MMHG | HEART RATE: 72 BPM | WEIGHT: 148 LBS

## 2024-02-01 DIAGNOSIS — E03.9 ACQUIRED HYPOTHYROIDISM: ICD-10-CM

## 2024-02-01 DIAGNOSIS — Z11.3 ENCOUNTER FOR SCREENING EXAMINATION FOR SEXUALLY TRANSMITTED DISEASE: ICD-10-CM

## 2024-02-01 DIAGNOSIS — Z11.59 NEED FOR HEPATITIS C SCREENING TEST: ICD-10-CM

## 2024-02-01 DIAGNOSIS — E83.51 HYPOCALCEMIA: Primary | ICD-10-CM

## 2024-02-01 PROBLEM — E20.819: Status: ACTIVE | Noted: 2023-01-10

## 2024-02-01 PROCEDURE — 3078F DIAST BP <80 MM HG: CPT | Performed by: NURSE PRACTITIONER

## 2024-02-01 PROCEDURE — 3074F SYST BP LT 130 MM HG: CPT | Performed by: NURSE PRACTITIONER

## 2024-02-01 PROCEDURE — 99214 OFFICE O/P EST MOD 30 MIN: CPT | Performed by: NURSE PRACTITIONER

## 2024-02-01 RX ORDER — LEVOTHYROXINE SODIUM 0.15 MG/1
150 TABLET ORAL
Qty: 90 TABLET | Refills: 2 | Status: SHIPPED | OUTPATIENT
Start: 2024-02-01

## 2024-02-01 ASSESSMENT — PATIENT HEALTH QUESTIONNAIRE - PHQ9: CLINICAL INTERPRETATION OF PHQ2 SCORE: 0

## 2024-02-01 NOTE — LETTER
[facility]  NAHOMI Montenegro  [address]  [place] [address]  Fax: [phone]   Authorization for Release/Disclosure of   Protected Health Information   Name: FREDERICK HILLIARD : 1985 SSN: [id number]   Address: [address] [address]  [place] [address] Phone:    [phone] (home)    I authorize the entity listed below to release/disclose the PHI below to:   [facility]/NAHOMI [place]en[place]ro and NAHOMI Montenegro   Provider or Entity Name:     Address   City, State, Zip   Phone:      Fax:     Reason for request: continuity of care   Information to be released:    [  ] LAST COLONOSCOPY,  including any PATH REPORT and follow-up  [  ] LAST FIT/COLOGUARD RESULT [  ] LAST DEXA  [  ] LAST MAMMOGRAM  [ x ] LAST PAP  [  ] LAST LABS [  ] RETINA EXAM REPORT  [  ] IMMUNIZATION RECORDS  [  ] Release all info      [  ] Check here and initial the line next to each item to release ALL health information INCLUDING  _____ Care and treatment for drug and / or alcohol abuse  _____ HIV testing, infection status, or AIDS  _____ Genetic Testing    DATES OF SERVICE OR TIME PERIOD TO BE DISCLOSED: _____________  I understand and acknowledge that:  * This Authorization may be revoked at any time by you in writing, except if your health information has already been used or disclosed.  * Your health information that will be used or disclosed as a result of you signing this authorization could be re-disclosed by the recipient. If this occurs, your re-disclosed health information may no longer be protected by State or Federal laws.  * You may refuse to sign this Authorization. Your refusal will not affect your ability to obtain treatment.  * This Authorization becomes effective upon signing and will  on (date) __________.      If no date is indicated, this Authorization will  one (1) year from the signature date.    Name: Frederick Hilliard  Signature: Date:   2024     PLEASE FAX REQUESTED  RECORDS BACK TO: [phone]

## 2024-02-01 NOTE — PROGRESS NOTES
Subjective:     CC: Establish care    HPI:   Thania is a 38 y.o. female presents to establish care. Previous PCP was Ruddy Terrazas PA-C. Specialists: GYN. Pt would like to discuss the following today:    Problem   Hypocalcemia    This is chronic, started after thyroidectomy in 2002. Reports that her calcium has been as low as in the 5-6s. She was previously seeing an Dr. Dwayne Guzman, Endocrinologist, up until 01/2023. She was started on calcitrol 0.25mg twice a day by her endocrinologist about 10 years ago.   She does endorse intermittent muscle cramping and numbness/tingling in her hands.      Acquired Hypothyroidism    This is chronic.   Current regimen: Levothyroxine 200mcg 6 days per week   She does endorse some hair loss in the last few months. She also reports her menses have been heavier in the last 18 months and with more PMS symptoms.           Health Maintenance/Immunizations: Completed    Current Outpatient Medications   Medication Sig Dispense Refill    levothyroxine (SYNTHROID) 150 MCG Tab Take 1 Tablet by mouth every morning on an empty stomach. 90 Tablet 2    calcitRIOL (ROCALTROL) 0.25 MCG Cap TAKE 1 CAPSULE BY MOUTH TWICE A  Capsule 1    EPINEPHrine (EPIPEN) 0.3 MG/0.3ML Solution Auto-injector solution for injection USE AS DIRECTED FOR SEVERE ALLERGIC REACTION       No current facility-administered medications for this visit.     Past Medical History:   Diagnosis Date    Abdominal pain     Amenorrhea     Elevated liver function tests     Hashimoto's thyroiditis     Hypothyroidism     Ovarian cyst     Splenomegaly      Past Surgical History:   Procedure Laterality Date    ORIF, FOOT Right 08/08/2017    Procedure: FOOT ORIF NAVICULAR ;  Surgeon: Patrick Whitten M.D.;  [facility];  Service:     GASTROSCOPY WITH BIOPSY  09/30/2008    Performed by CAITLIN JONES at [facility]    ERCP-DIAGNOSTIC  09/30/2008    Performed by CAITLIN JONES at [facility]  "[facility]S    THYROIDECTOMY TOTAL Bilateral 2002    DIANDRA BY LAPAROSCOPY      OTHER ORTHOPEDIC SURGERY      right ankle repair 2015      History reviewed. No pertinent family history.    Social History     Tobacco Use    Smoking status: Never    Smokeless tobacco: Never   Vaping Use    Vaping Use: Never used   Substance Use Topics    Alcohol use: Yes     Alcohol/week: 1.8 oz     Types: 3 Glasses of wine per week     Comment: 3\month    Drug use: Never      Review of Systems   Constitutional:  Negative for chills, fever and weight loss.   Respiratory:  Negative for shortness of breath.    Cardiovascular:  Negative for chest pain.   Skin:  Negative for rash.      Objective:     /70 (BP Location: Left arm, Patient Position: Sitting, BP Cuff Size: Adult)   Pulse 72   Temp 36.7 °C (98 °F) (Temporal)   Ht 1.702 m (5' 7\")   Wt 67.1 kg (148 lb)   LMP 01/28/2024   SpO2 98%   BMI 23.18 kg/m²  Body mass index is 23.18 kg/m².     Wt Readings from Last 4 Encounters:   02/01/24 67.1 kg (148 lb)   12/22/22 82.6 kg (182 lb 3.2 oz)   09/07/21 78 kg (171 lb 15.3 oz)   08/10/21 75.9 kg (167 lb 5.3 oz)     Physical Exam  Constitutional:       Appearance: Normal appearance.   Eyes:      Conjunctiva/sclera: Conjunctivae normal.      Pupils: Pupils are equal, round, and reactive to light.   Cardiovascular:      Rate and Rhythm: Normal rate and regular rhythm.      Heart sounds: Normal heart sounds. No murmur heard.  Pulmonary:      Effort: Pulmonary effort is normal. No respiratory distress.      Breath sounds: Normal breath sounds.   Musculoskeletal:      Right lower leg: No edema.      Left lower leg: No edema.   Lymphadenopathy:      Cervical: No cervical adenopathy.   Skin:     General: Skin is warm and dry.   Neurological:      General: No focal deficit present.      Mental Status: She is alert and oriented to person, place, and time.   Psychiatric:         Mood and Affect: Mood normal.         Behavior: Behavior " normal.        L[facility] reviewed: [facility] and found in Media    Assessment and Plan:   38 y.o. female with the following -    1. Hypocalcemia  Chronic, likely s/t to postsurgical hypoparathyroidism. Current calcium level at 7.9 and PTH at 13. I recommended she re-establish care with endocrinology and she was agreeable to this. Dicussed increasing calcitrol, but she would like to wait to see Endocrinology. Continue current regimen for now.   - Calcitrol 0.25mcg; 1 tablet by mouth twice a day  - Referral to Endocrinology    2. Acquired hypothyroidism  Chronic, unstable. Her TSH is low at 0.232 on a weekly dose of 1200mcg. Thus, she is over-replaced and I will lower her dose to 150mg daily (a 12.5% decrease). We will recheck her TSH in 8 weeks.    - TSH WITH REFLEX TO FT4; Future  - levothyroxine (SYNTHROID) 150 MCG Tab; Take 1 Tablet by mouth every morning on an empty stomach.  Dispense: 90 Tablet; Refill: 2    3. Need for hepatitis C screening test  - HEP C VIRUS ANTIBODY; Future    4. Encounter for screening examination for sexually transmitted disease  - HIV AG/AB COMBO ASSAY SCREENING; Future    Return in about 6 months (around 8/1/2024) for Annual.    Please note that this dictation was created using voice recognition software. I have made every reasonable attempt to correct obvious errors, but I expect that there are errors of grammar and possibly content that I did not discover before finalizing the note.

## 2024-02-02 ASSESSMENT — ENCOUNTER SYMPTOMS
SHORTNESS OF BREATH: 0
WEIGHT LOSS: 0
FEVER: 0
CHILLS: 0

## 2024-05-01 ENCOUNTER — DOCUMENTATION (OUTPATIENT)
Dept: HEALTH INFORMATION MANAGEMENT | Facility: OTHER | Age: 39
End: 2024-05-01
Payer: COMMERCIAL

## 2024-07-09 ENCOUNTER — APPOINTMENT (RX ONLY)
Dept: URBAN - METROPOLITAN AREA CLINIC 6 | Facility: CLINIC | Age: 39
Setting detail: DERMATOLOGY
End: 2024-07-09

## 2024-07-09 DIAGNOSIS — L81.4 OTHER MELANIN HYPERPIGMENTATION: ICD-10-CM

## 2024-07-09 DIAGNOSIS — D18.0 HEMANGIOMA: ICD-10-CM

## 2024-07-09 DIAGNOSIS — Z71.89 OTHER SPECIFIED COUNSELING: ICD-10-CM

## 2024-07-09 DIAGNOSIS — L82.1 OTHER SEBORRHEIC KERATOSIS: ICD-10-CM

## 2024-07-09 DIAGNOSIS — D22 MELANOCYTIC NEVI: ICD-10-CM

## 2024-07-09 DIAGNOSIS — L82.0 INFLAMED SEBORRHEIC KERATOSIS: ICD-10-CM

## 2024-07-09 PROBLEM — D22.5 MELANOCYTIC NEVI OF TRUNK: Status: ACTIVE | Noted: 2024-07-09

## 2024-07-09 PROBLEM — D18.01 HEMANGIOMA OF SKIN AND SUBCUTANEOUS TISSUE: Status: ACTIVE | Noted: 2024-07-09

## 2024-07-09 PROBLEM — D22.61 MELANOCYTIC NEVI OF RIGHT UPPER LIMB, INCLUDING SHOULDER: Status: ACTIVE | Noted: 2024-07-09

## 2024-07-09 PROBLEM — D22.71 MELANOCYTIC NEVI OF RIGHT LOWER LIMB, INCLUDING HIP: Status: ACTIVE | Noted: 2024-07-09

## 2024-07-09 PROBLEM — D22.62 MELANOCYTIC NEVI OF LEFT UPPER LIMB, INCLUDING SHOULDER: Status: ACTIVE | Noted: 2024-07-09

## 2024-07-09 PROBLEM — D22.72 MELANOCYTIC NEVI OF LEFT LOWER LIMB, INCLUDING HIP: Status: ACTIVE | Noted: 2024-07-09

## 2024-07-09 PROCEDURE — ? COUNSELING

## 2024-07-09 PROCEDURE — ? LIQUID NITROGEN

## 2024-07-09 PROCEDURE — 99203 OFFICE O/P NEW LOW 30 MIN: CPT | Mod: 25

## 2024-07-09 PROCEDURE — 17110 DESTRUCTION B9 LES UP TO 14: CPT

## 2024-07-09 ASSESSMENT — LOCATION SIMPLE DESCRIPTION DERM
LOCATION SIMPLE: CHEST
LOCATION SIMPLE: LEFT THIGH
LOCATION SIMPLE: LEFT UPPER ARM
LOCATION SIMPLE: RIGHT THIGH
LOCATION SIMPLE: RIGHT FOREARM
LOCATION SIMPLE: LEFT NOSE
LOCATION SIMPLE: RIGHT KNEE
LOCATION SIMPLE: ABDOMEN
LOCATION SIMPLE: RIGHT UPPER ARM
LOCATION SIMPLE: LEFT PRETIBIAL REGION
LOCATION SIMPLE: LEFT FOREARM
LOCATION SIMPLE: RIGHT CHEEK
LOCATION SIMPLE: RIGHT PRETIBIAL REGION
LOCATION SIMPLE: LEFT KNEE

## 2024-07-09 ASSESSMENT — LOCATION DETAILED DESCRIPTION DERM
LOCATION DETAILED: EPIGASTRIC SKIN
LOCATION DETAILED: LEFT PROXIMAL PRETIBIAL REGION
LOCATION DETAILED: RIGHT ANTERIOR PROXIMAL UPPER ARM
LOCATION DETAILED: RIGHT KNEE
LOCATION DETAILED: RIGHT PROXIMAL PRETIBIAL REGION
LOCATION DETAILED: LEFT VENTRAL PROXIMAL FOREARM
LOCATION DETAILED: LEFT KNEE
LOCATION DETAILED: LEFT ANTERIOR DISTAL THIGH
LOCATION DETAILED: RIGHT MEDIAL MALAR CHEEK
LOCATION DETAILED: RIGHT ANTERIOR DISTAL THIGH
LOCATION DETAILED: RIGHT ANTECUBITAL SKIN
LOCATION DETAILED: LEFT ANTERIOR DISTAL UPPER ARM
LOCATION DETAILED: RIGHT VENTRAL PROXIMAL FOREARM
LOCATION DETAILED: LEFT ANTERIOR PROXIMAL UPPER ARM
LOCATION DETAILED: LOWER STERNUM
LOCATION DETAILED: LEFT NASAL ALA
LOCATION DETAILED: PERIUMBILICAL SKIN
LOCATION DETAILED: RIGHT ANTERIOR DISTAL UPPER ARM

## 2024-07-09 ASSESSMENT — LOCATION ZONE DERM
LOCATION ZONE: TRUNK
LOCATION ZONE: ARM
LOCATION ZONE: LEG
LOCATION ZONE: FACE
LOCATION ZONE: NOSE

## 2024-07-09 NOTE — PROCEDURE: LIQUID NITROGEN
Spray Paint Technique: No
Medical Necessity Clause: This procedure was medically necessary because the lesions that were treated were:
Number Of Freeze-Thaw Cycles: 3 freeze-thaw cycles
Show Applicator Variable?: Yes
Spray Paint Text: The liquid nitrogen was applied to the skin utilizing a spray paint frosting technique.
Detail Level: Detailed
Post-Care Instructions: I reviewed with the patient in detail post-care instructions. Patient is to wear sunprotection, and avoid picking at any of the treated lesions. Pt may apply Vaseline to crusted or scabbing areas.
Medical Necessity Information: It is in your best interest to select a reason for this procedure from the list below. All of these items fulfill various CMS LCD requirements except the new and changing color options.
Consent: The patient's consent was obtained including but not limited to risks of crusting, scabbing, blistering, scarring, darker or lighter pigmentary change, recurrence, incomplete removal and infection.

## 2024-07-11 ENCOUNTER — TELEPHONE (OUTPATIENT)
Dept: ENDOCRINOLOGY | Facility: MEDICAL CENTER | Age: 39
End: 2024-07-11
Payer: COMMERCIAL

## 2024-07-22 ENCOUNTER — TELEPHONE (OUTPATIENT)
Dept: HEALTH INFORMATION MANAGEMENT | Facility: OTHER | Age: 39
End: 2024-07-22
Payer: COMMERCIAL

## 2024-07-25 ENCOUNTER — OFFICE VISIT (OUTPATIENT)
Dept: ENDOCRINOLOGY | Facility: MEDICAL CENTER | Age: 39
End: 2024-07-25
Payer: COMMERCIAL

## 2024-07-25 VITALS
HEIGHT: 67 IN | BODY MASS INDEX: 23.39 KG/M2 | WEIGHT: 149 LBS | SYSTOLIC BLOOD PRESSURE: 114 MMHG | DIASTOLIC BLOOD PRESSURE: 60 MMHG | HEART RATE: 72 BPM | OXYGEN SATURATION: 100 %

## 2024-07-25 DIAGNOSIS — E83.51 HYPOCALCEMIA: ICD-10-CM

## 2024-07-25 DIAGNOSIS — Z13.21 ENCOUNTER FOR VITAMIN DEFICIENCY SCREENING: ICD-10-CM

## 2024-07-25 DIAGNOSIS — E89.0 POSTSURGICAL HYPOTHYROIDISM: ICD-10-CM

## 2024-07-25 PROCEDURE — 3078F DIAST BP <80 MM HG: CPT

## 2024-07-25 PROCEDURE — 3074F SYST BP LT 130 MM HG: CPT

## 2024-07-25 PROCEDURE — 99214 OFFICE O/P EST MOD 30 MIN: CPT

## 2024-08-07 ENCOUNTER — HOSPITAL ENCOUNTER (OUTPATIENT)
Dept: LAB | Facility: MEDICAL CENTER | Age: 39
End: 2024-08-07
Payer: COMMERCIAL

## 2024-08-07 DIAGNOSIS — E89.0 POSTSURGICAL HYPOTHYROIDISM: ICD-10-CM

## 2024-08-07 DIAGNOSIS — Z13.21 ENCOUNTER FOR VITAMIN DEFICIENCY SCREENING: ICD-10-CM

## 2024-08-07 DIAGNOSIS — E83.51 HYPOCALCEMIA: ICD-10-CM

## 2024-08-07 PROCEDURE — 84439 ASSAY OF FREE THYROXINE: CPT

## 2024-08-07 PROCEDURE — 36415 COLL VENOUS BLD VENIPUNCTURE: CPT

## 2024-08-07 PROCEDURE — 84432 ASSAY OF THYROGLOBULIN: CPT

## 2024-08-07 PROCEDURE — 84443 ASSAY THYROID STIM HORMONE: CPT

## 2024-08-07 PROCEDURE — 86800 THYROGLOBULIN ANTIBODY: CPT

## 2024-08-07 PROCEDURE — 80053 COMPREHEN METABOLIC PANEL: CPT

## 2024-08-07 PROCEDURE — 84481 FREE ASSAY (FT-3): CPT

## 2024-08-07 PROCEDURE — 82306 VITAMIN D 25 HYDROXY: CPT

## 2024-08-08 DIAGNOSIS — Z13.21 ENCOUNTER FOR VITAMIN DEFICIENCY SCREENING: ICD-10-CM

## 2024-08-08 DIAGNOSIS — E89.0 POSTSURGICAL HYPOTHYROIDISM: ICD-10-CM

## 2024-08-08 DIAGNOSIS — E83.51 HYPOCALCEMIA: ICD-10-CM

## 2024-08-08 LAB
25(OH)D3 SERPL-MCNC: 32 NG/ML (ref 30–100)
ALBUMIN SERPL BCP-MCNC: 4.2 G/DL (ref 3.2–4.9)
ALBUMIN/GLOB SERPL: 1.8 G/DL
ALP SERPL-CCNC: 35 U/L (ref 30–99)
ALT SERPL-CCNC: 14 U/L (ref 2–50)
ANION GAP SERPL CALC-SCNC: 10 MMOL/L (ref 7–16)
AST SERPL-CCNC: 17 U/L (ref 12–45)
BILIRUB SERPL-MCNC: 0.5 MG/DL (ref 0.1–1.5)
BUN SERPL-MCNC: 19 MG/DL (ref 8–22)
CALCIUM ALBUM COR SERPL-MCNC: 8.2 MG/DL (ref 8.5–10.5)
CALCIUM SERPL-MCNC: 8.4 MG/DL (ref 8.5–10.5)
CHLORIDE SERPL-SCNC: 106 MMOL/L (ref 96–112)
CO2 SERPL-SCNC: 23 MMOL/L (ref 20–33)
CREAT SERPL-MCNC: 0.88 MG/DL (ref 0.5–1.4)
GFR SERPLBLD CREATININE-BSD FMLA CKD-EPI: 85 ML/MIN/1.73 M 2
GLOBULIN SER CALC-MCNC: 2.3 G/DL (ref 1.9–3.5)
GLUCOSE SERPL-MCNC: 82 MG/DL (ref 65–99)
POTASSIUM SERPL-SCNC: 4.3 MMOL/L (ref 3.6–5.5)
PROT SERPL-MCNC: 6.5 G/DL (ref 6–8.2)
SODIUM SERPL-SCNC: 139 MMOL/L (ref 135–145)
T3FREE SERPL-MCNC: 2.25 PG/ML (ref 2–4.4)
T4 FREE SERPL-MCNC: 1.67 NG/DL (ref 0.93–1.7)
TSH SERPL-ACNC: 1.06 UIU/ML (ref 0.35–5.5)

## 2024-08-09 LAB
THYROGLOB AB SERPL-ACNC: 3.9 IU/ML (ref 0–4)
THYROGLOB SERPL-MCNC: 3 NG/ML (ref 1.3–31.8)
THYROGLOB SERPL-MCNC: NORMAL NG/ML (ref 1.3–31.8)

## 2024-09-05 ENCOUNTER — TELEPHONE (OUTPATIENT)
Dept: ENDOCRINOLOGY | Facility: MEDICAL CENTER | Age: 39
End: 2024-09-05
Payer: COMMERCIAL

## 2024-09-10 ENCOUNTER — HOSPITAL ENCOUNTER (OUTPATIENT)
Dept: LAB | Facility: MEDICAL CENTER | Age: 39
End: 2024-09-10
Payer: COMMERCIAL

## 2024-09-10 DIAGNOSIS — E89.0 POSTSURGICAL HYPOTHYROIDISM: ICD-10-CM

## 2024-09-10 DIAGNOSIS — Z13.21 ENCOUNTER FOR VITAMIN DEFICIENCY SCREENING: ICD-10-CM

## 2024-09-10 DIAGNOSIS — E83.51 HYPOCALCEMIA: ICD-10-CM

## 2024-09-10 LAB
25(OH)D3 SERPL-MCNC: 36 NG/ML (ref 30–100)
ALBUMIN SERPL BCP-MCNC: 4.1 G/DL (ref 3.2–4.9)
ALBUMIN/GLOB SERPL: 1.6 G/DL
ALP SERPL-CCNC: 38 U/L (ref 30–99)
ALT SERPL-CCNC: 12 U/L (ref 2–50)
ANION GAP SERPL CALC-SCNC: 10 MMOL/L (ref 7–16)
AST SERPL-CCNC: 21 U/L (ref 12–45)
BILIRUB SERPL-MCNC: 0.4 MG/DL (ref 0.1–1.5)
BUN SERPL-MCNC: 20 MG/DL (ref 8–22)
CALCIUM ALBUM COR SERPL-MCNC: 8.2 MG/DL (ref 8.5–10.5)
CALCIUM SERPL-MCNC: 8.3 MG/DL (ref 8.5–10.5)
CHLORIDE SERPL-SCNC: 106 MMOL/L (ref 96–112)
CO2 SERPL-SCNC: 23 MMOL/L (ref 20–33)
CREAT SERPL-MCNC: 0.86 MG/DL (ref 0.5–1.4)
GFR SERPLBLD CREATININE-BSD FMLA CKD-EPI: 87 ML/MIN/1.73 M 2
GLOBULIN SER CALC-MCNC: 2.5 G/DL (ref 1.9–3.5)
GLUCOSE SERPL-MCNC: 87 MG/DL (ref 65–99)
POTASSIUM SERPL-SCNC: 4.4 MMOL/L (ref 3.6–5.5)
PROT SERPL-MCNC: 6.6 G/DL (ref 6–8.2)
SODIUM SERPL-SCNC: 139 MMOL/L (ref 135–145)
T3FREE SERPL-MCNC: 2.54 PG/ML (ref 2–4.4)
T4 FREE SERPL-MCNC: 1.44 NG/DL (ref 0.93–1.7)
TSH SERPL-ACNC: 4.8 UIU/ML (ref 0.35–5.5)

## 2024-09-10 PROCEDURE — 84443 ASSAY THYROID STIM HORMONE: CPT

## 2024-09-10 PROCEDURE — 80053 COMPREHEN METABOLIC PANEL: CPT

## 2024-09-10 PROCEDURE — 36415 COLL VENOUS BLD VENIPUNCTURE: CPT

## 2024-09-10 PROCEDURE — 82306 VITAMIN D 25 HYDROXY: CPT

## 2024-09-10 PROCEDURE — 84439 ASSAY OF FREE THYROXINE: CPT

## 2024-09-10 PROCEDURE — 86800 THYROGLOBULIN ANTIBODY: CPT

## 2024-09-10 PROCEDURE — 84481 FREE ASSAY (FT-3): CPT

## 2024-09-10 PROCEDURE — 84432 ASSAY OF THYROGLOBULIN: CPT

## 2024-09-12 ENCOUNTER — OFFICE VISIT (OUTPATIENT)
Dept: ENDOCRINOLOGY | Facility: MEDICAL CENTER | Age: 39
End: 2024-09-12
Payer: COMMERCIAL

## 2024-09-12 VITALS
HEIGHT: 67 IN | DIASTOLIC BLOOD PRESSURE: 66 MMHG | HEART RATE: 70 BPM | OXYGEN SATURATION: 100 % | WEIGHT: 148 LBS | SYSTOLIC BLOOD PRESSURE: 110 MMHG | BODY MASS INDEX: 23.23 KG/M2

## 2024-09-12 DIAGNOSIS — E83.51 HYPOCALCEMIA: ICD-10-CM

## 2024-09-12 DIAGNOSIS — E89.0 POSTSURGICAL HYPOTHYROIDISM: ICD-10-CM

## 2024-09-12 LAB
THYROGLOB AB SERPL-ACNC: 3.3 IU/ML (ref 0–4)
THYROGLOB SERPL-MCNC: 3.9 NG/ML (ref 1.3–31.8)
THYROGLOB SERPL-MCNC: NORMAL NG/ML (ref 1.3–31.8)

## 2024-09-12 PROCEDURE — 3078F DIAST BP <80 MM HG: CPT

## 2024-09-12 PROCEDURE — 99214 OFFICE O/P EST MOD 30 MIN: CPT

## 2024-09-12 PROCEDURE — 99211 OFF/OP EST MAY X REQ PHY/QHP: CPT

## 2024-09-12 PROCEDURE — 3074F SYST BP LT 130 MM HG: CPT

## 2024-09-12 RX ORDER — LEVOTHYROXINE SODIUM 13 UG/1
150 CAPSULE ORAL
Qty: 34 CAPSULE | Refills: 0 | Status: SHIPPED | OUTPATIENT
Start: 2024-09-12

## 2024-09-12 NOTE — PROGRESS NOTES
CHIEF COMPLAINT: Followup of postsurgical hypothyroidism and hypocalcemia.    HPI:   Thania Hilliard is a 39 y.o. female, who had initial total thyroidectomy at the age of 17 years due to unknown causes. She believes  she was told it was not cancerous however she did receive one therapy of BASS. She states that one of her parathyroid glands was also removed.    She did have problems with postoperative hypocalcemia.    Latest Reference Range & Units 09/10/24 11:04   Thyroglobulin by LC-MC/MS-S/P 1.3 - 31.8 ng/mL Not Applicable   Thyroglobulin 1.3 - 31.8 ng/mL 3.9   Anti-Thyroglobulin 0.0 - 4.0 IU/mL 3.3       Postsurgical hypothyroidism  She has tried and failed levothyroxine, synthroid and cytomel  Since last visit, she has remained on levothyroxine 150 micrograms daily, which has been her dose since 7 months.    At last visit we discussed not taking calcium at the same time and wait 4 hrs to take. She admits to taking levothyroxine and calcium 30 mins apart.   She is feeling fatigued    Energy level has been poor.    Weight is Stable.    Reports: fatigue- worse, cold intolerance- same, hair loss- same, irritability-same  No palpitations, no frequent diarrhea, or frequent constipation.  No heat or cold intolerance.  No anterior neck symptoms or problems.  No voice changes.  Denies taking multivitamin or biotin   Latest Reference Range & Units 08/07/24 09:19 09/10/24 11:04   TSH 0.350 - 5.500 uIU/mL 1.060 4.800   Free T-4 0.93 - 1.70 ng/dL 1.67 1.44   T3,Free 2.00 - 4.40 pg/mL 2.25 2.54     2. Hypocalcemia  Currently taking 0.5 mcg BID   2 tums daily  Reports: muscle cramps, muscle aches, fingers and or feet   Latest Reference Range & Units 08/07/24 09:19 09/10/24 11:04   Calcium 8.5 - 10.5 mg/dL 8.4 (L) 8.3 (L)   Correct Calcium 8.5 - 10.5 mg/dL 8.2 (L) 8.2 (L)      Latest Reference Range & Units 09/10/24 11:04   25-Hydroxy   Vitamin D 25 30 - 100 ng/mL 36     Patient's medications, allergies, and social histories  "were reviewed and updated as appropriate.      ROS:      CONS:     No fever, no chills   EYES:     No diplopia, no blurry vision   CV:           No chest pain, no palpitations   PULM:     No SOB, no cough, no hemoptysis.   GI:            No nausea, no vomiting, no diarrhea, no constipation   ENDO:     No polyuria, no polydipsia, no heat intolerance, no cold intolerance       Past Medical History:  Problem List:  2024-02: Hypocalcemia  2022-12: Acquired hypothyroidism  2022-12: Multiple food allergies      Past Surgical History:  Past Surgical History:   Procedure Laterality Date    ORIF, FOOT Right 08/08/2017    Procedure: FOOT ORIF NAVICULAR ;  Surgeon: Patrick Whitten M.D.;  Location: SURGERY [facility];  Service:     GASTROSCOPY WITH BIOPSY  09/30/2008    Performed by CAITLIN JONES at [facility]    ERCP-DIAGNOSTIC  09/30/2008    Performed by CAITLIN JONES at [facility]    THYROIDECTOMY TOTAL Bilateral 2002    DIANDRA BY LAPAROSCOPY      OTHER ORTHOPEDIC SURGERY      right ankle repair 2015        Allergies:  Charentais melon ([place]er melon), Other food, [place], Promethazine hcl, and Tape     Social History:  Social History     Tobacco Use    Smoking status: Never    Smokeless tobacco: Never   Vaping Use    Vaping status: Never Used   Substance Use Topics    Alcohol use: Yes     Alcohol/week: 1.8 oz     Types: 3 Glasses of wine per week     Comment: once a week    Drug use: Never        Family History:   family history is not on file.      PHYSICAL EXAM:   Vital signs: /66 (BP Location: Left arm, Patient Position: Sitting, BP Cuff Size: Adult)   Pulse 70   Ht 1.702 m (5' 7\")   Wt 67.1 kg (148 lb)   SpO2 100%   BMI 23.18 kg/m²   GENERAL: Well-developed, well-nourished in no apparent distress.   EYE:  No ocular asymmetry, PERRLA  HENT: Pink, moist mucous membranes.    NECK: Well healed transverse scar, Thyroid is surgically absent   CARDIOVASCULAR:  No murmurs  LUNGS: " "Clear breath sounds  ABDOMEN: Soft, nontender   EXTREMITIES: No clubbing, cyanosis, or edema.   NEUROLOGICAL: No gross focal motor abnormalities   LYMPH: No cervical adenopathy palpated.   SKIN: No rashes, lesions.       Labs:  Lab Results   Component Value Date/Time    WBC 4.5 03/13/2023 10:12 AM    RBC 4.12 03/13/2023 10:12 AM    HEMOGLOBIN 13.4 03/13/2023 10:12 AM    MCV 96 03/13/2023 10:12 AM    MCH 32.5 03/13/2023 10:12 AM    MCHC 33.9 03/13/2023 10:12 AM    RDW 11.9 03/13/2023 10:12 AM    MPV 9.2 03/30/2021 06:20 PM       Lab Results   Component Value Date/Time    SODIUM 139 09/10/2024 11:04 AM    POTASSIUM 4.4 09/10/2024 11:04 AM    CHLORIDE 106 09/10/2024 11:04 AM    CO2 23 09/10/2024 11:04 AM    ANION 10.0 09/10/2024 11:04 AM    GLUCOSE 87 09/10/2024 11:04 AM    BUN 20 09/10/2024 11:04 AM    CREATININE 0.86 09/10/2024 11:04 AM    CALCIUM 8.3 (L) 09/10/2024 11:04 AM    ASTSGOT 21 09/10/2024 11:04 AM    ALTSGPT 12 09/10/2024 11:04 AM    TBILIRUBIN 0.4 09/10/2024 11:04 AM    ALBUMIN 4.1 09/10/2024 11:04 AM    TOTPROTEIN 6.6 09/10/2024 11:04 AM    GLOBULIN 2.5 09/10/2024 11:04 AM    AGRATIO 1.6 09/10/2024 11:04 AM       No results found for: \"TSHULTRASEN\"  No results found for: \"FREET4\"  No results found for: \"FREET3\"  No results found for: \"THYSTIMIG\"      Imaging:      ASSESSMENT/PLAN:   1. Postsurgical hypothyroidism  Unstable  TSH 4.8  Medication:  Levothyroxine 150 mcg daily - change to Tirosint 150 mcg 6 days and 2 tabs one day   She is to take the tirosint first thing every morning by itself with plain water and wait 30 minutes before eating.  We reviewed the importance of adhering to thyroid hormone replacement therapy.  We reviewed symptoms of under and over replacement of thyroid hormone including fatigue, weight changes, heat or cold intolerance, palpitations, tremor.  She should notify me for difficulty with such symptoms.   Recommend patient take her calcium 4 hours from tirosint for better " absorption of the medication.   - Levothyroxine Sodium 150 MCG Cap; Take 150 mcg by mouth every day.  Dispense: 34 Capsule; Refill: 0  - TSH; Future  - FREE THYROXINE; Future  - US-THYROID; Future  - THYROGLOBULIN, QT; Future    2. Hypocalcemia  Unstable  Medication:  Calcitriol 0.5 mcg BID - continue  Calcium citrate 333 mg BID - start  - Calcium Citrate 333 MG Tab; Take 333 mg by mouth 2 times a day.  Dispense: 180 Tablet; Refill: 3  - Comp Metabolic Panel; Future       Return in about 2 months (around 11/12/2024). Patient will have blood work done prior to follow up prior to follow up in 2 months.      Thank you kindly for allowing me to participate in the thyroid care plan for this patient.    Edy Goodwin, APRN   9/12/24    CC:   NAHOMI Monten[place]ro

## 2024-09-18 ENCOUNTER — HOSPITAL ENCOUNTER (OUTPATIENT)
Dept: RADIOLOGY | Facility: MEDICAL CENTER | Age: 39
End: 2024-09-18
Payer: COMMERCIAL

## 2024-09-18 DIAGNOSIS — E89.0 POSTSURGICAL HYPOTHYROIDISM: ICD-10-CM

## 2024-09-18 PROCEDURE — 76536 US EXAM OF HEAD AND NECK: CPT

## 2024-10-16 DIAGNOSIS — E89.0 POSTSURGICAL HYPOTHYROIDISM: ICD-10-CM

## 2024-10-16 RX ORDER — LEVOTHYROXINE SODIUM 13 UG/1
1 CAPSULE ORAL
Qty: 34 CAPSULE | Refills: 0 | Status: SHIPPED | OUTPATIENT
Start: 2024-10-16

## 2024-11-07 ENCOUNTER — TELEPHONE (OUTPATIENT)
Dept: ENDOCRINOLOGY | Facility: MEDICAL CENTER | Age: 39
End: 2024-11-07
Payer: COMMERCIAL

## 2024-11-07 ENCOUNTER — HOSPITAL ENCOUNTER (OUTPATIENT)
Dept: LAB | Facility: MEDICAL CENTER | Age: 39
End: 2024-11-07
Payer: COMMERCIAL

## 2024-11-07 DIAGNOSIS — E89.0 POSTSURGICAL HYPOTHYROIDISM: ICD-10-CM

## 2024-11-07 DIAGNOSIS — E83.51 HYPOCALCEMIA: ICD-10-CM

## 2024-11-07 LAB
25(OH)D3 SERPL-MCNC: 35 NG/ML (ref 30–100)
ALBUMIN SERPL BCP-MCNC: 4.2 G/DL (ref 3.2–4.9)
ALBUMIN/GLOB SERPL: 1.6 G/DL
ALP SERPL-CCNC: 45 U/L (ref 30–99)
ALT SERPL-CCNC: 14 U/L (ref 2–50)
ANION GAP SERPL CALC-SCNC: 11 MMOL/L (ref 7–16)
AST SERPL-CCNC: 21 U/L (ref 12–45)
BILIRUB SERPL-MCNC: 0.3 MG/DL (ref 0.1–1.5)
BUN SERPL-MCNC: 22 MG/DL (ref 8–22)
CALCIUM ALBUM COR SERPL-MCNC: 8.6 MG/DL (ref 8.5–10.5)
CALCIUM SERPL-MCNC: 8.8 MG/DL (ref 8.5–10.5)
CHLORIDE SERPL-SCNC: 104 MMOL/L (ref 96–112)
CO2 SERPL-SCNC: 25 MMOL/L (ref 20–33)
CORTIS SERPL-MCNC: 5.8 UG/DL (ref 0–23)
CREAT SERPL-MCNC: 0.79 MG/DL (ref 0.5–1.4)
ESTRADIOL SERPL-MCNC: 46 PG/ML
FSH SERPL-ACNC: 7.8 MIU/ML
GFR SERPLBLD CREATININE-BSD FMLA CKD-EPI: 96 ML/MIN/1.73 M 2
GLOBULIN SER CALC-MCNC: 2.7 G/DL (ref 1.9–3.5)
GLUCOSE SERPL-MCNC: 62 MG/DL (ref 65–99)
LH SERPL-ACNC: 5.6 IU/L
POTASSIUM SERPL-SCNC: 3.8 MMOL/L (ref 3.6–5.5)
PROGEST SERPL-MCNC: 0.08 NG/ML
PROT SERPL-MCNC: 6.9 G/DL (ref 6–8.2)
SODIUM SERPL-SCNC: 140 MMOL/L (ref 135–145)
T4 FREE SERPL-MCNC: 1.41 NG/DL (ref 0.93–1.7)
TSH SERPL-ACNC: 10.8 UIU/ML (ref 0.35–5.5)

## 2024-11-07 PROCEDURE — 83002 ASSAY OF GONADOTROPIN (LH): CPT

## 2024-11-07 PROCEDURE — 84432 ASSAY OF THYROGLOBULIN: CPT

## 2024-11-07 PROCEDURE — 84403 ASSAY OF TOTAL TESTOSTERONE: CPT

## 2024-11-07 PROCEDURE — 86800 THYROGLOBULIN ANTIBODY: CPT

## 2024-11-07 PROCEDURE — 83001 ASSAY OF GONADOTROPIN (FSH): CPT

## 2024-11-07 PROCEDURE — 82306 VITAMIN D 25 HYDROXY: CPT

## 2024-11-07 PROCEDURE — 84144 ASSAY OF PROGESTERONE: CPT

## 2024-11-07 PROCEDURE — 84443 ASSAY THYROID STIM HORMONE: CPT

## 2024-11-07 PROCEDURE — 82670 ASSAY OF TOTAL ESTRADIOL: CPT

## 2024-11-07 PROCEDURE — 80053 COMPREHEN METABOLIC PANEL: CPT

## 2024-11-07 PROCEDURE — 36415 COLL VENOUS BLD VENIPUNCTURE: CPT

## 2024-11-07 PROCEDURE — 84439 ASSAY OF FREE THYROXINE: CPT

## 2024-11-07 PROCEDURE — 82533 TOTAL CORTISOL: CPT

## 2024-11-09 LAB
THYROGLOB AB SERPL-ACNC: 2.5 IU/ML (ref 0–4)
THYROGLOB SERPL-MCNC: 4.3 NG/ML (ref 1.3–31.8)
THYROGLOB SERPL-MCNC: NORMAL NG/ML (ref 1.3–31.8)

## 2024-11-13 ENCOUNTER — OFFICE VISIT (OUTPATIENT)
Dept: ENDOCRINOLOGY | Facility: MEDICAL CENTER | Age: 39
End: 2024-11-13
Payer: COMMERCIAL

## 2024-11-13 VITALS
BODY MASS INDEX: 23.39 KG/M2 | WEIGHT: 149 LBS | OXYGEN SATURATION: 97 % | DIASTOLIC BLOOD PRESSURE: 62 MMHG | HEIGHT: 67 IN | HEART RATE: 68 BPM | SYSTOLIC BLOOD PRESSURE: 108 MMHG

## 2024-11-13 DIAGNOSIS — R53.83 FATIGUE, UNSPECIFIED TYPE: ICD-10-CM

## 2024-11-13 DIAGNOSIS — E89.0 POSTSURGICAL HYPOTHYROIDISM: ICD-10-CM

## 2024-11-13 DIAGNOSIS — E83.51 HYPOCALCEMIA: ICD-10-CM

## 2024-11-13 LAB — TESTOST SERPL-MCNC: 15 NG/DL (ref 9–55)

## 2024-11-13 PROCEDURE — 3078F DIAST BP <80 MM HG: CPT

## 2024-11-13 PROCEDURE — 99214 OFFICE O/P EST MOD 30 MIN: CPT

## 2024-11-13 PROCEDURE — 99211 OFF/OP EST MAY X REQ PHY/QHP: CPT

## 2024-11-13 PROCEDURE — 3074F SYST BP LT 130 MM HG: CPT

## 2024-11-13 RX ORDER — LEVOTHYROXINE SODIUM 175 UG/1
175 CAPSULE ORAL DAILY
Qty: 90 CAPSULE | Refills: 3 | Status: SHIPPED | OUTPATIENT
Start: 2024-11-13

## 2024-11-13 NOTE — PROGRESS NOTES
CHIEF COMPLAINT: Followup of postsurgical hypothyroidism and hypocalcemia.    HPI:   Thania Hilliard is a 39 y.o. female, who had initial total thyroidectomy at the age of 17 years due to unknown causes. She believes  she was told it was not cancerous however she did receive one therapy of BASS. She states that one of her parathyroid glands was also removed.    She did have problems with postoperative hypocalcemia.    Latest Reference Range & Units 11/07/24 11:50   Anti-Thyroglobulin 0.0 - 4.0 IU/mL 2.5      Latest Reference Range & Units 11/07/24 11:50   Thyroglobulin by LC-MC/MS-S/P 1.3 - 31.8 ng/mL Not Applicable   Thyroglobulin 1.3 - 31.8 ng/mL 4.3      Latest Reference Range & Units 09/10/24 11:04   Thyroglobulin by LC-MC/MS-S/P 1.3 - 31.8 ng/mL Not Applicable   Thyroglobulin 1.3 - 31.8 ng/mL 3.9   Anti-Thyroglobulin 0.0 - 4.0 IU/mL 3.3     US of Thyroid 9/18/24:  FINDINGS:  The thyroid gland is surgically absent. No evidence of residual or recurrent thyroid tissue.     There multiple nonenlarged anterior and posterior chain cervical lymph nodes. These all demonstrate normal fatty sarita. No pathologic lymph nodes seen.     IMPRESSION:     Prior thyroidectomy. No evidence of recurrent or residual thyroid tissue or enlarged lymph nodes.    Postsurgical hypothyroidism  She has tried and failed levothyroxine, synthroid and cytomel  Since last visit, she has remained on Tirosint 150 micrograms 5 days 2 tabs on two days which has been her dose since 2months.      At last visit we discussed not taking calcium at the same time and wait 4 hrs to take. She admits to taking levothyroxine and calcium 30 mins apart.     She is feeling fatigued    Energy level has been poor.    Weight is increased 6lbs  Reports: fatigue- worse, cold intolerance- worse, hair loss- better, irritability-better  No palpitations, no frequent diarrhea, or frequent constipation.  No heat or cold intolerance.  No anterior neck symptoms or problems.   No voice changes.  Denies taking multivitamin or biotin     Latest Reference Range & Units 11/07/24 11:50   TSH 0.350 - 5.500 uIU/mL 10.800 (H)      Latest Reference Range & Units 08/07/24 09:19 09/10/24 11:04   TSH 0.350 - 5.500 uIU/mL 1.060 4.800   Free T-4 0.93 - 1.70 ng/dL 1.67 1.44   T3,Free 2.00 - 4.40 pg/mL 2.25 2.54     2. Hypocalcemia  Calcitriol 0.5 mcg BID - continue  Calcium citrate 333 mg BID -continue  Reports: muscle cramps, muscle aches, fingers and or feet   Latest Reference Range & Units 11/07/24 11:50   Calcium 8.5 - 10.5 mg/dL 8.8   Correct Calcium 8.5 - 10.5 mg/dL 8.6      Latest Reference Range & Units 08/07/24 09:19 09/10/24 11:04   Calcium 8.5 - 10.5 mg/dL 8.4 (L) 8.3 (L)   Correct Calcium 8.5 - 10.5 mg/dL 8.2 (L) 8.2 (L)        Latest Reference Range & Units 11/07/24 11:52   25-Hydroxy   Vitamin D 25 30 - 100 ng/mL 35      Latest Reference Range & Units 11/07/24 11:52   Cortisol 0.0 - 23.0 ug/dL 5.8       Patient's medications, allergies, and social histories were reviewed and updated as appropriate.      ROS:      CONS:     No fever, no chills   EYES:     No diplopia, no blurry vision   CV:           No chest pain, no palpitations   PULM:     No SOB, no cough, no hemoptysis.   GI:            No nausea, no vomiting, no diarrhea, no constipation   ENDO:     No polyuria, no polydipsia, no heat intolerance, no cold intolerance       Past Medical History:  Problem List:  2024-02: Hypocalcemia  2022-12: Acquired hypothyroidism  2022-12: Multiple food allergies      Past Surgical History:  Past Surgical History:   Procedure Laterality Date    ORIF, FOOT Right 08/08/2017    Procedure: FOOT ORIF NAVICULAR ;  Surgeon: Patrick Whitten M.D.;  Location: SURGERY [facility];  Service:     GASTROSCOPY WITH BIOPSY  09/30/2008    Performed by CAITLIN JONES at SURGERY [facility] ORS    ERCP-DIAGNOSTIC  09/30/2008    Performed by CAITLIN JONES at [facility] ORS    THYROIDECTOMY TOTAL  "Bilateral 2002    DIANDRA BY LAPAROSCOPY      OTHER ORTHOPEDIC SURGERY      right ankle repair 2015        Allergies:  Charentais melon (Malay melon), Other food, Holland, Promethazine hcl, and Tape     Social History:  Social History     Tobacco Use    Smoking status: Never    Smokeless tobacco: Never   Vaping Use    Vaping status: Never Used   Substance Use Topics    Alcohol use: Yes     Alcohol/week: 1.8 oz     Types: 3 Glasses of wine per week     Comment: once a week    Drug use: Never        Family History:   family history is not on file.      PHYSICAL EXAM:   Vital signs: /62 (BP Location: Left arm, Patient Position: Sitting, BP Cuff Size: Adult)   Pulse 68   Ht 1.702 m (5' 7\")   Wt 67.6 kg (149 lb)   SpO2 97%   BMI 23.34 kg/m²   GENERAL: Well-developed, well-nourished in no apparent distress.   EYE:  No ocular asymmetry, PERRLA  HENT: Pink, moist mucous membranes.    NECK: Well healed transverse scar, Thyroid is surgically absent   CARDIOVASCULAR:  No murmurs  LUNGS: Clear breath sounds  ABDOMEN: Soft, nontender   EXTREMITIES: No clubbing, cyanosis, or edema.   NEUROLOGICAL: No gross focal motor abnormalities   LYMPH: No cervical adenopathy palpated.   SKIN: No rashes, lesions.       Labs:  Lab Results   Component Value Date/Time    WBC 4.5 03/13/2023 10:12 AM    RBC 4.12 03/13/2023 10:12 AM    HEMOGLOBIN 13.4 03/13/2023 10:12 AM    MCV 96 03/13/2023 10:12 AM    MCH 32.5 03/13/2023 10:12 AM    MCHC 33.9 03/13/2023 10:12 AM    RDW 11.9 03/13/2023 10:12 AM    MPV 9.2 03/30/2021 06:20 PM       Lab Results   Component Value Date/Time    SODIUM 140 11/07/2024 11:50 AM    POTASSIUM 3.8 11/07/2024 11:50 AM    CHLORIDE 104 11/07/2024 11:50 AM    CO2 25 11/07/2024 11:50 AM    ANION 11.0 11/07/2024 11:50 AM    GLUCOSE 62 (L) 11/07/2024 11:50 AM    BUN 22 11/07/2024 11:50 AM    CREATININE 0.79 11/07/2024 11:50 AM    CALCIUM 8.8 11/07/2024 11:50 AM    ASTSGOT 21 11/07/2024 11:50 AM    ALTSGPT 14 11/07/2024 " "11:50 AM    TBILIRUBIN 0.3 11/07/2024 11:50 AM    ALBUMIN 4.2 11/07/2024 11:50 AM    TOTPROTEIN 6.9 11/07/2024 11:50 AM    GLOBULIN 2.7 11/07/2024 11:50 AM    AGRATIO 1.6 11/07/2024 11:50 AM       No results found for: \"TSHULTRASEN\"  No results found for: \"FREET4\"  No results found for: \"FREET3\"  No results found for: \"THYSTIMIG\"      Imaging:      ASSESSMENT/PLAN:   1. Postsurgical hypothyroidism  Unstable  TSH 10  Medication:  Tirosint 150 mcg 6 days and 2 tabs one day - change to Tirosint 175 mcg daily   She is to take the tirosint first thing every morning by itself with plain water and wait 30 minutes before eating.  We reviewed the importance of adhering to thyroid hormone replacement therapy.  We reviewed symptoms of under and over replacement of thyroid hormone including fatigue, weight changes, heat or cold intolerance, palpitations, tremor.  She should notify me for difficulty with such symptoms.   Recommend patient take her calcium 4 hours from tirosint for better absorption of the medication.   - TIROSINT 175 MCG Cap; Take 175 mcg by mouth every day.  Dispense: 90 Capsule; Refill: 3  - TSH; Future  - FREE THYROXINE; Future  - T3 FREE; Future    2. Fatigue, unspecified type  Unstable  I will get am cortisol for further evaluation. Patient had cortisol levels for OBGYN which are unreliable due to time of draw.   - CORTISOL - AM    3. Hypocalcemia  Stable  Continue current regimen- see hPI  - Comp Metabolic Panel; Future       Return in about 3 months (around 2/13/2025). Patient will have blood work done prior to follow up prior to follow up in 3 months.      Thank you kindly for allowing me to participate in the thyroid care plan for this patient.    Edy Goodwin, LENA   11/13/24    CC:   NAHOMI [place]          "

## 2024-12-03 DIAGNOSIS — E89.0 POSTSURGICAL HYPOTHYROIDISM: ICD-10-CM

## 2024-12-03 RX ORDER — LEVOTHYROXINE SODIUM 13 UG/1
1 CAPSULE ORAL
Qty: 30 CAPSULE | Refills: 6 | Status: SHIPPED | OUTPATIENT
Start: 2024-12-03

## 2025-02-18 ENCOUNTER — HOSPITAL ENCOUNTER (OUTPATIENT)
Dept: LAB | Facility: MEDICAL CENTER | Age: 40
End: 2025-02-18
Payer: COMMERCIAL

## 2025-02-18 DIAGNOSIS — E89.0 POSTSURGICAL HYPOTHYROIDISM: ICD-10-CM

## 2025-02-18 DIAGNOSIS — E83.51 HYPOCALCEMIA: ICD-10-CM

## 2025-02-18 PROCEDURE — 84443 ASSAY THYROID STIM HORMONE: CPT

## 2025-02-18 PROCEDURE — 84481 FREE ASSAY (FT-3): CPT

## 2025-02-18 PROCEDURE — 80053 COMPREHEN METABOLIC PANEL: CPT

## 2025-02-18 PROCEDURE — 84439 ASSAY OF FREE THYROXINE: CPT

## 2025-02-18 PROCEDURE — 36415 COLL VENOUS BLD VENIPUNCTURE: CPT

## 2025-02-19 LAB
ALBUMIN SERPL BCP-MCNC: 4.2 G/DL (ref 3.2–4.9)
ALBUMIN/GLOB SERPL: 1.8 G/DL
ALP SERPL-CCNC: 44 U/L (ref 30–99)
ALT SERPL-CCNC: 13 U/L (ref 2–50)
ANION GAP SERPL CALC-SCNC: 11 MMOL/L (ref 7–16)
AST SERPL-CCNC: 18 U/L (ref 12–45)
BILIRUB SERPL-MCNC: 0.5 MG/DL (ref 0.1–1.5)
BUN SERPL-MCNC: 25 MG/DL (ref 8–22)
CALCIUM ALBUM COR SERPL-MCNC: 8.5 MG/DL (ref 8.5–10.5)
CALCIUM SERPL-MCNC: 8.7 MG/DL (ref 8.5–10.5)
CHLORIDE SERPL-SCNC: 104 MMOL/L (ref 96–112)
CO2 SERPL-SCNC: 24 MMOL/L (ref 20–33)
CREAT SERPL-MCNC: 0.85 MG/DL (ref 0.5–1.4)
GFR SERPLBLD CREATININE-BSD FMLA CKD-EPI: 88 ML/MIN/1.73 M 2
GLOBULIN SER CALC-MCNC: 2.4 G/DL (ref 1.9–3.5)
GLUCOSE SERPL-MCNC: 80 MG/DL (ref 65–99)
POTASSIUM SERPL-SCNC: 4.2 MMOL/L (ref 3.6–5.5)
PROT SERPL-MCNC: 6.6 G/DL (ref 6–8.2)
SODIUM SERPL-SCNC: 139 MMOL/L (ref 135–145)
T3FREE SERPL-MCNC: 3.03 PG/ML (ref 2–4.4)
T4 FREE SERPL-MCNC: 1.73 NG/DL (ref 0.93–1.7)
TSH SERPL-ACNC: 1.39 UIU/ML (ref 0.35–5.5)

## 2025-02-28 ENCOUNTER — OFFICE VISIT (OUTPATIENT)
Dept: ENDOCRINOLOGY | Facility: MEDICAL CENTER | Age: 40
End: 2025-02-28
Payer: COMMERCIAL

## 2025-02-28 VITALS
OXYGEN SATURATION: 97 % | HEART RATE: 58 BPM | BODY MASS INDEX: 23.07 KG/M2 | WEIGHT: 147 LBS | HEIGHT: 67 IN | DIASTOLIC BLOOD PRESSURE: 80 MMHG | SYSTOLIC BLOOD PRESSURE: 122 MMHG

## 2025-02-28 DIAGNOSIS — R53.83 FATIGUE, UNSPECIFIED TYPE: ICD-10-CM

## 2025-02-28 DIAGNOSIS — R68.82 LOW LIBIDO: ICD-10-CM

## 2025-02-28 DIAGNOSIS — E83.51 HYPOCALCEMIA: ICD-10-CM

## 2025-02-28 DIAGNOSIS — E89.0 POSTSURGICAL HYPOTHYROIDISM: ICD-10-CM

## 2025-02-28 PROCEDURE — 99211 OFF/OP EST MAY X REQ PHY/QHP: CPT

## 2025-02-28 RX ORDER — TESTOSTERONE
POWDER (GRAM) MISCELLANEOUS
Qty: 30 G | Refills: 5 | Status: SHIPPED | OUTPATIENT
Start: 2025-02-28 | End: 2025-03-28

## 2025-03-18 DIAGNOSIS — R68.82 LOW LIBIDO: ICD-10-CM

## 2025-03-18 RX ORDER — TESTOSTERONE
POWDER (GRAM) MISCELLANEOUS
Qty: 30 G | Refills: 5 | Status: SHIPPED | OUTPATIENT
Start: 2025-03-18 | End: 2025-04-15

## 2025-04-22 DIAGNOSIS — E89.0 POSTSURGICAL HYPOTHYROIDISM: ICD-10-CM

## 2025-04-22 RX ORDER — LEVOTHYROXINE SODIUM 175 UG/1
175 CAPSULE ORAL DAILY
Qty: 90 CAPSULE | Refills: 3 | Status: SHIPPED | OUTPATIENT
Start: 2025-04-22

## 2025-05-12 ENCOUNTER — APPOINTMENT (OUTPATIENT)
Dept: URBAN - METROPOLITAN AREA CLINIC 6 | Facility: CLINIC | Age: 40
Setting detail: DERMATOLOGY
End: 2025-05-12

## 2025-05-12 DIAGNOSIS — Z71.89 OTHER SPECIFIED COUNSELING: ICD-10-CM

## 2025-05-12 PROBLEM — D48.5 NEOPLASM OF UNCERTAIN BEHAVIOR OF SKIN: Status: ACTIVE | Noted: 2025-05-12

## 2025-05-12 PROCEDURE — ? COUNSELING

## 2025-05-12 PROCEDURE — 99212 OFFICE O/P EST SF 10 MIN: CPT | Mod: 25

## 2025-05-12 PROCEDURE — 11102 TANGNTL BX SKIN SINGLE LES: CPT

## 2025-05-12 PROCEDURE — 11103 TANGNTL BX SKIN EA SEP/ADDL: CPT

## 2025-05-12 PROCEDURE — ? BIOPSY BY SHAVE METHOD

## 2025-05-28 DIAGNOSIS — R68.82 LOW LIBIDO: Primary | ICD-10-CM

## 2025-05-28 DIAGNOSIS — R68.82 LOW LIBIDO: ICD-10-CM

## 2025-05-28 RX ORDER — ESTRADIOL 0.04 MG/D
1 PATCH TRANSDERMAL
Qty: 6 PATCH | Refills: 3 | Status: SHIPPED | OUTPATIENT
Start: 2025-05-28 | End: 2025-05-29

## 2025-05-28 RX ORDER — PROGESTERONE 100 MG/1
100 CAPSULE ORAL DAILY
Qty: 90 CAPSULE | Refills: 3 | Status: SHIPPED | OUTPATIENT
Start: 2025-05-28

## 2025-05-29 RX ORDER — ESTRADIOL 0.04 MG/D
1 PATCH, EXTENDED RELEASE TRANSDERMAL
Qty: 12 PATCH | Refills: 0 | Status: SHIPPED | OUTPATIENT
Start: 2025-05-29

## 2025-07-09 ENCOUNTER — APPOINTMENT (OUTPATIENT)
Dept: ENDOCRINOLOGY | Facility: MEDICAL CENTER | Age: 40
End: 2025-07-09
Payer: COMMERCIAL

## (undated) DEVICE — MASK, LARYNGEAL AIRWAY #4

## (undated) DEVICE — GLOVE BIOGEL INDICATOR SZ 6.5 SURGICAL PF LTX - (50PR/BX 4BX/CA)

## (undated) DEVICE — KIT ROOM DECONTAMINATION

## (undated) DEVICE — PADDING CAST 6 IN STERILE - 6 X 4 YDS (24/CA)

## (undated) DEVICE — SODIUM CHL IRRIGATION 0.9% 1000ML (12EA/CA)

## (undated) DEVICE — STOCKINET BIAS 6 IN STERILE - (20/CA)

## (undated) DEVICE — SUTURE GENERAL

## (undated) DEVICE — BLADE SURGICAL CLIPPER - (50EA/CA)

## (undated) DEVICE — MASK ANESTHESIA ADULT  - (100/CA)

## (undated) DEVICE — STERI STRIP COMPOUND BENZOIN - TINCTURE 0.6ML WITH APPLICATOR (40EA/BX)

## (undated) DEVICE — DRAPE LARGE 3 QUARTER - (20/CA)

## (undated) DEVICE — CANISTER SUCTION 3000ML MECHANICAL FILTER AUTO SHUTOFF MEDI-VAC NONSTERILE LF DISP  (40EA/CA)

## (undated) DEVICE — Device

## (undated) DEVICE — HEAD HOLDER JUNIOR/ADULT

## (undated) DEVICE — ELECTRODE 850 FOAM ADHESIVE - HYDROGEL RADIOTRNSPRNT (50/PK)

## (undated) DEVICE — GOWN WARMING STANDARD FLEX - (30/CA)

## (undated) DEVICE — BLADE SURGICAL #15 - (50/BX 3BX/CA)

## (undated) DEVICE — PACK LOWER EXTREMITY - (2/CA)

## (undated) DEVICE — ELECTRODE DUAL RETURN W/ CORD - (50/PK)

## (undated) DEVICE — GLOVE BIOGEL SZ 8 SURGICAL PF LTX - (50PR/BX 4BX/CA)

## (undated) DEVICE — SPLINT PLASTER 5 IN X 30 IN - (50EA/BX 6BX/CA)

## (undated) DEVICE — CHLORAPREP 26 ML APPLICATOR - ORANGE TINT(25/CA)

## (undated) DEVICE — KIT EVACUATER 3 SPRING PVC LF 1/8 DRAIN SIZE (10EA/CA)"

## (undated) DEVICE — WRAP COBAN SELF-ADHERENT 6 IN X  5YDS STERILE TAN (12/CA)

## (undated) DEVICE — DRAPE C-ARM LARGE 41IN X 74 IN - (10/BX 2BX/CA)

## (undated) DEVICE — NEPTUNE 4 PORT MANIFOLD - (20/PK)

## (undated) DEVICE — SET LEADWIRE 5 LEAD BEDSIDE DISPOSABLE ECG (1SET OF 5/EA)

## (undated) DEVICE — TUBING CLEARLINK DUO-VENT - C-FLO (48EA/CA)

## (undated) DEVICE — BLOCK

## (undated) DEVICE — DRESSING ABDOMINAL PAD STERILE 8 X 10" (360EA/CA)"

## (undated) DEVICE — GLOVE BIOGEL SZ 7 SURGICAL PF LTX - (50PR/BX 4BX/CA)

## (undated) DEVICE — KIT ANESTHESIA W/CIRCUIT & 3/LT BAG W/FILTER (20EA/CA)

## (undated) DEVICE — LACTATED RINGERS INJ 1000 ML - (14EA/CA 60CA/PF)

## (undated) DEVICE — SET EXTENSION WITH 2 PORTS (48EA/CA) ***PART #2C8610 IS A SUBSTITUTE*****

## (undated) DEVICE — GLOVE BIOGEL INDICATOR SZ 8.5 SURGICAL PF LTX - (50/BX 4BX/CA)

## (undated) DEVICE — SENSOR SPO2 NEO LNCS ADHESIVE (20/BX) SEE USER NOTES

## (undated) DEVICE — PROTECTOR ULNA NERVE - (36PR/CA)

## (undated) DEVICE — SUTURE 3-0 VICRYL PLUS SH - 8X 18 INCH (12/BX)

## (undated) DEVICE — GLOVE BIOGEL SZ 6.5 SURGICAL PF LTX (50PR/BX 4BX/CA)

## (undated) DEVICE — CLOSURE SKIN STRIP 1/2 X 4 IN - (STERI STRIP) (50/BX 4BX/CA)

## (undated) DEVICE — SLEEVE, VASO, THIGH, MED

## (undated) DEVICE — PAD LAP STERILE 18 X 18 - (5/PK 40PK/CA)

## (undated) DEVICE — SUCTION INSTRUMENT YANKAUER BULBOUS TIP W/O VENT (50EA/CA)

## (undated) DEVICE — WATER IRRIG. STER. 1500 ML - (9/CA)